# Patient Record
Sex: MALE | Race: WHITE | NOT HISPANIC OR LATINO | Employment: OTHER | URBAN - NONMETROPOLITAN AREA
[De-identification: names, ages, dates, MRNs, and addresses within clinical notes are randomized per-mention and may not be internally consistent; named-entity substitution may affect disease eponyms.]

---

## 2017-01-05 ENCOUNTER — OFFICE VISIT (OUTPATIENT)
Dept: MEDICAL GROUP | Facility: PHYSICIAN GROUP | Age: 82
End: 2017-01-05
Payer: MEDICARE

## 2017-01-05 VITALS
OXYGEN SATURATION: 94 % | HEART RATE: 60 BPM | WEIGHT: 229 LBS | DIASTOLIC BLOOD PRESSURE: 68 MMHG | TEMPERATURE: 98.2 F | RESPIRATION RATE: 16 BRPM | BODY MASS INDEX: 32.78 KG/M2 | HEIGHT: 70 IN | SYSTOLIC BLOOD PRESSURE: 112 MMHG

## 2017-01-05 DIAGNOSIS — E11.8 DIABETIC COMPLICATION (HCC): ICD-10-CM

## 2017-01-05 LAB — GLUCOSE BLD-MCNC: 198 MG/DL (ref 70–100)

## 2017-01-05 PROCEDURE — 82962 GLUCOSE BLOOD TEST: CPT | Performed by: INTERNAL MEDICINE

## 2017-01-05 PROCEDURE — 99214 OFFICE O/P EST MOD 30 MIN: CPT | Performed by: INTERNAL MEDICINE

## 2017-01-05 NOTE — ASSESSMENT & PLAN NOTE
The patient is an 81yo diabetic male who comes in with concerns about his diabetes. Patient recently established with endocrinology with Dr. Malloy. He previously was seen by Dr. Kerr in [place]. Patient was switched for insurance reasons from Humalog to NovoLog. He recently started a shot last night. Previously he was on 20 units of Humalog twice a day but patient tells me he was actually on Humalog 75/25. From the notes in the system, it does not appear that endocrinology was aware that the patient was on the mixed insulin. He specifically states that the insulin was cloudy that he originally was on he was switched on that is clear. Patient took 20 units of NovoLog last night 15 years before his meal as usual but felt very shaky just before eating. His sugar was 82. This morning when he woke up his sugar was in the 80s again. He took 20 units of NovoLog prior to breakfast. He did not take anything before lunch and his glucose today in the room after lunch is 198. He has 6 vials of the NovoLog.    I discussed with the patient today options. He would like to use up the NovoLog as he has some many vials. I'm changing him to 10 units prior to each meal and told him to take the shot 5 minutes prior to eating. I clearly told him if he does not have food in front of him he should not take insulin. Going to contact endocrinology and let them know that he technically was not on short acting Humalog but was instead on the mixed. They may want to add a long-acting insulin or switch him to NovoLog 70/30 prior to his next refill.

## 2017-01-05 NOTE — PROGRESS NOTES
Chief Complaint   Patient presents with   • Diabetes     discuss meds-Novolog       HISTORY OF PRESENT ILLNESS: Patient is a 82 y.o. male established patient who presents today to be seen for an acute issue.    Diabetic complication (HCC)  The patient is an 83yo diabetic male who comes in with concerns about his diabetes. Patient recently established with endocrinology with Dr. Malloy. He previously was seen by Dr. Kerr in [place]. Patient was switched for insurance reasons from Humalog to NovoLog. He recently started a shot last night. Previously he was on 20 units of Humalog twice a day but patient tells me he was actually on Humalog 75/25. From the notes in the system, it does not appear that endocrinology was aware that the patient was on the mixed insulin. He specifically states that the insulin was cloudy that he originally was on he was switched on that is clear. Patient took 20 units of NovoLog last night 15 years before his meal as usual but felt very shaky just before eating. His sugar was 82. This morning when he woke up his sugar was in the 80s again. He took 20 units of NovoLog prior to breakfast. He did not take anything before lunch and his glucose today in the room after lunch is 198. He has 6 vials of the NovoLog.    I discussed with the patient today options. He would like to use up the NovoLog as he has some many vials. I'm changing him to 10 units prior to each meal and told him to take the shot 5 minutes prior to eating. I clearly told him if he does not have food in front of him he should not take insulin. Going to contact endocrinology and let them know that he technically was not on short acting Humalog but was instead on the mixed. They may want to add a long-acting insulin or switch him to NovoLog 70/30 prior to his next refill.      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    Current Outpatient Prescriptions Ordered in Psychiatric   Medication Sig Dispense Refill   • insulin regular (HUMULIN R) 100 Unit/mL Solution Inject 20 Units as instructed 3 times a day before meals. Inject 30 minutes before meals 20 mL 6   • Insulin Syringe-Needle U-100 (INSULIN SYRINGE .5CC/31GX5/16\") 31G X 5/16\" 0.5 ML Misc For insulin injections 3 times a day 300 Each 2   • pioglitazone (ACTOS) 15 MG Tab      • valsartan-hydrochlorothiazide (DIOVAN HCT) 320-12.5 MG per tablet Take 1 Tab by mouth every day.     • furosemide (LASIX) 40 MG Tab Take 40 mg by mouth every day.     • hydrALAZINE (APRESOLINE) 25 MG TABS Take 25 mg by mouth 3 times a day.     • warfarin (COUMADIN) 6 MG TABS Take 1 Tab by mouth every day at 6 PM. 30 Tab 1   • potassium chloride CR (K-TABS) 10 MEQ tablet Take 20 mEq by mouth every day.     • pravastatin (PRAVACHOL) 20 MG TABS Take 80 mg by mouth every evening.     • omeprazole (PRILOSEC) 20 MG CPDR Take 40 mg by mouth every day.     • finasteride (PROSCAR) 5 MG TABS Take 5 mg by mouth every day.     • valsartan-hydrochlorothiazide (DIOVAN-HCT) 320-12.5 MG per tablet Take 0.5 Tabs by mouth every day.     • amlodipine (NORVASC) 5 MG TABS Take 5 mg by mouth every day.     • vitamin D (CHOLECALCIFEROL) 1000 UNIT TABS Take 1,000 Units by mouth every day.       No current Psychiatric-ordered facility-administered medications on file.       Past Medical History   Diagnosis Date   • Hypertension    • Arrhythmia      ATRIAL FIB   • Dental disorder    • Urinary bladder disorder      BPH " "  • Glaucoma      BLIND RIGHT EYE   • Heart murmur    • Peripheral vascular disease (HCC)    • Wound of left leg      COVERED W/ DRESSING OA TO PACU       Social History   Substance Use Topics   • Smoking status: Never Smoker    • Smokeless tobacco: Former User     Quit date: 05/04/2010   • Alcohol Use: No       No family status information on file.   No family history on file.    ROS:  Review of Systems   Constitutional: Negative for fever and malaise/fatigue.   HENT: Negative for congestion  Respiratory: Negative for cough  Cardiovascular: Negative for chest pain  Gastrointestinal: Negative for nausea, vomiting and abdominal pain.  All other systems reviewed and are negative except as in HPI.      Exam:  Blood pressure 112/68, pulse 60, temperature 36.8 °C (98.2 °F), resp. rate 16, height 1.778 m (5' 10\"), weight 103.874 kg (229 lb), SpO2 94 %.  General:  Well nourished, well developed elderly male in NAD  Head is grossly normal.  Neck: Supple without JVD   Pulmonary: Clear to ausculation and percussion.  Normal effort. No rales, ronchi, or wheezing.  Cardiovascular: Regular rate and rhythm without murmur. Carotid and radial pulses are intact and equal bilaterally.  Extremities: no clubbing, cyanosis, or edema.    Glucose :  198    Assessment/Plan:  1. Diabetic complication (HCC)  POCT Glucose    uncontrolled, will discuss with Endocrinology.     Please note that this dictation was created using voice recognition software. I have made every reasonable attempt to correct obvious errors, but I expect that there are errors of grammar and possibly content that I did not discover before finalizing the note.         "

## 2017-01-05 NOTE — PATIENT INSTRUCTIONS
1. With your new insulin Novolog, take 10 units prior to each large meal about 5 minutes before you eat.    2. If you do not eat, DO NOT TAKE NOVOLOG.    3. Dr. Abraham will message Endocrinology to let them know about this.

## 2017-01-05 NOTE — MR AVS SNAPSHOT
"        John Patejv   2017 1:20 PM   Office Visit   MRN: [id number]    Department:  [facility]   Dept Phone:  [phone]    Description:  Male : 1934   Provider:  Sangeeta Abraham M.D.           Reason for Visit     Diabetes discuss meds-Novolog      Allergies as of 2017     No Known Allergies      You were diagnosed with     Diabetic complication (HCC)   [832607]         Vital Signs     Blood Pressure Pulse Temperature Respirations Height Weight    112/68 mmHg 60 36.8 °C (98.2 °F) 16 1.778 m (5' 10\") 103.874 kg (229 lb)    Body Mass Index Oxygen Saturation Smoking Status             32.86 kg/m2 94% Never Smoker          Basic Information     Date Of Birth Sex Race Ethnicity Preferred Language    1934 Male White Non- English      Your appointments     2017  8:45 AM   Anti-Coag Routine with [place] ANTI-COAG   [facility]    [address] [place] [id number] [phone]            Feb 10, 2017  9:00 AM   Established Patient with AIYANA Easley   [facility]    [address] [place] [id number] [phone]           You will be receiving a confirmation call a few days before your appointment from our automated call confirmation system.            Mar 24, 2017 10:00 AM   Telemedicine Clinic Established Pt with Sky Malloy M.D., TELEMED ENDOCRINOLOGY MD, TELEMEDICINE [facility])    [address], [address]  [facility] [address] [phone]              Problem List              ICD-10-CM Priority Class Noted - Resolved    Brachial artery occlusion, right (HCC) I74.2   5/3/2013 - Present    Hypertension I10   5/3/2013 - Present    Type 2 diabetes mellitus with complication (HCC) E11.8   5/3/2013 - Present    PVD (peripheral vascular disease) ([facility]) I73.9   5/3/2013 - Present    Atrial fibrillation (HCC) I48.91   " 5/3/2013 - Present    Esophageal reflux K21.9   5/3/2013 - Present    Dyslipidemia E78.5   5/3/2013 - Present    Atrial fibrillation (HCC) I48.91   9/25/2015 - Present    Amputation of foot, left, traumatic, complicated (HCC) S98.912A, T87.9   11/9/2016 - Present      Health Maintenance        Date Due Completion Dates    DIABETES MONOFILAMTENT / LE EXAM 1935 ---    RETINAL SCREENING 7/27/1952 ---    IMM DTaP/Tdap/Td Vaccine (1 - Tdap) 7/27/1953 ---    IMM PNEUMOCOCCAL 65+ (ADULT) LOW/MEDIUM RISK SERIES (1 of 2 - PCV13) 7/27/1999 ---    SERUM CREATININE 5/4/2014 5/4/2013, 4/5/2006    A1C SCREENING 5/15/2017 11/15/2016, 10/12/2006, 7/6/2006, 4/5/2006, 1/3/2006    FASTING LIPID PROFILE 11/15/2017 11/15/2016, 10/12/2006, 7/6/2006, 4/5/2006    URINE ACR / MICROALBUMIN 11/15/2017 11/15/2016, 1/3/2006            Results     POCT Glucose      Component Value Standard Range & Units    Glucose - Accu-Ck 198 70 - 100 mg/dL                        Current Immunizations     Influenza TIV (IM) 11/1/2012    Influenza Vaccine Adult HD 8/19/2016    SHINGLES VACCINE 11/3/2016      Below and/or attached are the medications your provider expects you to take. Review all of your home medications and newly ordered medications with your provider and/or pharmacist. Follow medication instructions as directed by your provider and/or pharmacist. Please keep your medication list with you and share with your provider. Update the information when medications are discontinued, doses are changed, or new medications (including over-the-counter products) are added; and carry medication information at all times in the event of emergency situations     Allergies:  No Known Allergies          Medications  Valid as of: January 05, 2017 -  1:47 PM    Generic Name Brand Name Tablet Size Instructions for use    AmLODIPine Besylate (Tab) NORVASC 5 MG Take 5 mg by mouth every day.        Cholecalciferol (Tab) cholecalciferol 1000 UNIT Take 1,000 Units by  "mouth every day.        Finasteride (Tab) PROSCAR 5 MG Take 5 mg by mouth every day.        Furosemide (Tab) LASIX 40 MG Take 40 mg by mouth every day.        HydrALAZINE HCl (Tab) APRESOLINE 25 MG Take 25 mg by mouth 3 times a day.        Insulin Regular Human (Solution) HUMULIN R 100 Unit/mL Inject 20 Units as instructed 3 times a day before meals. Inject 30 minutes before meals        Insulin Syringe-Needle U-100 (Misc) INSULIN SYRINGE .5CC/31GX5/16\" 31G X 5/16\" 0.5 ML For insulin injections 3 times a day        Omeprazole (CAPSULE DELAYED RELEASE) PRILOSEC 20 MG Take 40 mg by mouth every day.        Pioglitazone HCl (Tab) ACTOS 15 MG         Potassium Chloride (Tab CR) KLOR-CON 10 MEQ Take 20 mEq by mouth every day.        Pravastatin Sodium (Tab) PRAVACHOL 20 MG Take 80 mg by mouth every evening.        Valsartan-Hydrochlorothiazide (Tab) DIOVAN--12.5 MG Take 0.5 Tabs by mouth every day.        Valsartan-Hydrochlorothiazide (Tab) DIOVAN--12.5 MG Take 1 Tab by mouth every day.        Warfarin Sodium (Tab) COUMADIN 6 MG Take 1 Tab by mouth every day at 6 PM.        .                 Medicines prescribed today were sent to:     [facility] [address] [place] [address]    Phone: [phone] Fax: [phone]    Open 24 Hours?: No      Medication refill instructions:       If your prescription bottle indicates you have medication refills left, it is not necessary to call your provider’s office. Please contact your pharmacy and they will refill your medication.    If your prescription bottle indicates you do not have any refills left, you may request refills at any time through one of the following ways: The online [employer] system (except Urgent Care), by calling your provider’s office, or by asking your pharmacy to contact your provider’s office with a refill request. Medication refills are processed only during regular business hours " and may not be available until the next business day. Your provider may request additional information or to have a follow-up visit with you prior to refilling your medication.   *Please Note: Medication refills are assigned a new Rx number when refilled electronically. Your pharmacy may indicate that no refills were authorized even though a new prescription for the same medication is available at the pharmacy. Please request the medicine by name with the pharmacy before contacting your provider for a refill.        Instructions    1. With your new insulin Novolog, take 10 units prior to each large meal about 5 minutes before you eat.    2. If you do not eat, DO NOT TAKE NOVOLOG.    3. Dr. Abraham will message Endocrinology to let them know about this.          Impermium Access Code: [email or web address]  Expires: 1/11/2017 12:41 PM    Impermium  A secure, online tool to manage your health information     [employer] Impermium® is a secure, online tool that connects you to your personalized health information from the privacy of your home -- day or night - making it very easy for you to manage your healthcare. Once the activation process is completed, you can even access your medical information using the Impermium mallorie, which is available for free in the Apple Mallorie store or Google Play store.     Impermium provides the following levels of access (as shown below):   My Chart Features   [facility] Doctor [facility]  Urgent  Care Non-[facility]  Primary Care  Doctor   Email your healthcare team securely and privately 24/7 X X X    Manage appointments: schedule your next appointment; view details of past/upcoming appointments X      Request prescription refills. X      View recent personal medical records, including lab and immunizations X X X X   View health record, including health history, allergies, medications X X X X   Read reports about your outpatient visits, procedures, consult and ER notes X X X X    See your discharge summary, which is a recap of your hospital and/or ER visit that includes your diagnosis, lab results, and care plan. X X       How to register for "[employer]":  1. Go to  [email or web address].  2. Click on the Sign Up Now box, which takes you to the New Member Sign Up page. You will need to provide the following information:  a. Enter your "[employer]" Access Code exactly as it appears at the top of this page. (You will not need to use this code after you’ve completed the sign-up process. If you do not sign up before the expiration date, you must request a new code.)   b. Enter your date of birth.   c. Enter your home email address.   d. Click Submit, and follow the next screen’s instructions.  3. Create a "[employer]" ID. This will be your "[employer]" login ID and cannot be changed, so think of one that is secure and easy to remember.  4. Create a "[employer]" password. You can change your password at any time.  5. Enter your Password Reset Question and Answer. This can be used at a later time if you forget your password.   6. Enter your e-mail address. This allows you to receive e-mail notifications when new information is available in "[employer]".  7. Click Sign Up. You can now view your health information.    For assistance activating your "[employer]" account, call [phone]

## 2017-01-09 ENCOUNTER — TELEPHONE (OUTPATIENT)
Dept: ENDOCRINOLOGY | Facility: MEDICAL CENTER | Age: 82
End: 2017-01-09

## 2017-01-09 NOTE — TELEPHONE ENCOUNTER
Called Pt and notified.  Pt mentioned that he don't know how to retrieve the reading out of his meter.    Unless if he is going to [facility] and also nobody can help him at home to retrieve his BS reading.    Thank you  Leah

## 2017-01-09 NOTE — TELEPHONE ENCOUNTER
Patient came in to [facility] to retrieve bs reading out of his meter.   Here is the last week of readings.:  1/9/ at 8am  1/8/ at 6pm, 223 at 8am  1/7/17 339 at 8am  1/6/17 268 at 6pm, 240 at 8am  1/5/17 175 at 6 pm, 223 at 8am  1/4/17 213 at 8pm, 125 at 7am  1/3/17 267 at 5pm, 175 at 5 pm  1/2/17 224 at 5pm, 140 at 8am

## 2017-01-09 NOTE — TELEPHONE ENCOUNTER
----- Message from Sky Malloy M.D. sent at 1/6/2017  3:59 PM PST -----  Please contact this patient to get some information on his blood sugar log, we need to know the blood sugar readings to make some changes to his insulin regimen.

## 2017-01-10 ENCOUNTER — TELEPHONE (OUTPATIENT)
Dept: ENDOCRINOLOGY | Facility: MEDICAL CENTER | Age: 82
End: 2017-01-10

## 2017-01-10 DIAGNOSIS — E11.65 TYPE 2 DIABETES MELLITUS WITH HYPERGLYCEMIA, WITH LONG-TERM CURRENT USE OF INSULIN (HCC): ICD-10-CM

## 2017-01-10 DIAGNOSIS — Z79.4 TYPE 2 DIABETES MELLITUS WITH HYPERGLYCEMIA, WITH LONG-TERM CURRENT USE OF INSULIN (HCC): ICD-10-CM

## 2017-01-20 ENCOUNTER — ANTICOAGULATION VISIT (OUTPATIENT)
Dept: MEDICAL GROUP | Facility: PHYSICIAN GROUP | Age: 82
End: 2017-01-20
Payer: MEDICARE

## 2017-01-20 ENCOUNTER — ANTICOAGULATION MONITORING (OUTPATIENT)
Dept: MEDICAL GROUP | Facility: PHYSICIAN GROUP | Age: 82
End: 2017-01-20

## 2017-01-20 DIAGNOSIS — I48.21 PERMANENT ATRIAL FIBRILLATION (HCC): ICD-10-CM

## 2017-01-20 DIAGNOSIS — I48.91 ATRIAL FIBRILLATION, UNSPECIFIED TYPE (HCC): ICD-10-CM

## 2017-01-20 LAB — INR PPP: 2.4 (ref 2–3.5)

## 2017-01-20 PROCEDURE — 85610 PROTHROMBIN TIME: CPT | Performed by: PHYSICIAN ASSISTANT

## 2017-01-20 RX ORDER — WARFARIN SODIUM 6 MG/1
6 TABLET ORAL DAILY
Qty: 90 TAB | Refills: 1 | Status: SHIPPED | OUTPATIENT
Start: 2017-01-20 | End: 2017-11-20 | Stop reason: SDUPTHER

## 2017-01-20 NOTE — PROGRESS NOTES
Anticoagulation Summary as of 1/20/2017     INR goal 2.0-3.0   Selected INR 2.4 (1/20/2017)   Maintenance plan 6 mg (6 mg x 1) on Mon, Wed, Fri; 3 mg (6 mg x 0.5) all other days   Weekly total 30 mg   Plan last modified Efe Tay, PHARMD (12/12/2016)   Next INR check 3/3/2017   Target end date Indefinite    Indications   Atrial fibrillation (CMS-HCC) [I48.91]  Atrial fibrillation (CMS-HCC) [I48.91]         Anticoagulation Episode Summary     INR check location [facility]    Preferred lab     Send INR reminders to     Comments       Anticoagulation Care Providers     Provider Role Specialty Phone number    Dontae Ng D.O. [facility] [phone]    [facility]   [phone]    JOHN Easley.  Family Medicine [phone]        Anticoagulation Patient Findings      John Hernandez seen in clinic today  INR  therapeutic.    Denies signs/symptoms of bleeding and/or thrombosis.    Denies changes to diet or medications.   Follow up appointment in 6 week(s).    Continue weekly warfarin dose as noted    Keith Collado, PHARMD

## 2017-01-20 NOTE — MR AVS SNAPSHOT
John SUMA Mary   2017 8:45 AM   Anticoagulation Visit   MRN: [id number]    Department:  [facility]   Dept Phone:  [phone]    Description:  Male : 1934   Provider:  KM ANTI-COAG           Allergies as of 2017     No Known Allergies      You were diagnosed with     Atrial fibrillation, unspecified type (CMS-HCC)   [3307169]       Permanent atrial fibrillation (CMS-HCC)   [480401]         Vital Signs     Smoking Status                   Never Smoker            Basic Information     Date Of Birth Sex Race Ethnicity Preferred Language    1934 Male White Non- English      Your appointments     2017  8:45 AM   Anti-Coag Routine with [place] ANTI-COAG   [facility] ([place])    [address] [place] [id number] [phone]            Feb 10, 2017  9:00 AM   Established Patient with AIYANA Easley   [facility] ([place])    [address] [place] [id number] [phone]           You will be receiving a confirmation call a few days before your appointment from our automated call confirmation system.            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   5/3/2013 - Present    Hypertension I10   5/3/2013 - Present    Type 2 diabetes mellitus with complication (CMS-[facility]) E11.8   5/3/2013 - Present    PVD (peripheral vascular disease) (CMS-HCC) I73.9   5/3/2013 - Present     Atrial fibrillation (CMS-HCC) I48.91   5/3/2013 - Present    Esophageal reflux K21.9   5/3/2013 - Present    Dyslipidemia E78.5   5/3/2013 - Present    Atrial fibrillation (CMS-HCC) I48.91   9/25/2015 - Present    Amputation of foot, left, traumatic, 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, T87.9   11/9/2016 - Present    Diabetic complication (CMS-HCC) E11.8   1/5/2017 - Present      Health Maintenance        Date Due Completion Dates    DIABETES MONOFILAMENT / LE EXAM 1935 ---    RETINAL SCREENING 7/27/1952 ---    IMM DTaP/Tdap/Td Vaccine (1 - Tdap) 7/27/1953 ---    IMM PNEUMOCOCCAL 65+ (ADULT) LOW/MEDIUM RISK SERIES (1 of 2 - PCV13) 7/27/1999 ---    SERUM CREATININE 5/4/2014 5/4/2013, 4/5/2006    A1C SCREENING 5/15/2017 11/15/2016, 10/12/2006, 7/6/2006, 4/5/2006, 1/3/2006    FASTING LIPID PROFILE 11/15/2017 11/15/2016, 10/12/2006, 7/6/2006, 4/5/2006    URINE ACR / MICROALBUMIN 11/15/2017 11/15/2016, 1/3/2006            Results     POCT Protime      Component    INR    2.4    Comment:     ic valid [id number] 160 exp 10/2017                        Current Immunizations     Influenza TIV (IM) 11/1/2012    Influenza Vaccine Adult HD 8/19/2016    SHINGLES VACCINE 11/3/2016      Below and/or attached are the medications your provider expects you to take. Review all of your home medications and newly ordered medications with your provider and/or pharmacist. Follow medication instructions as directed by your provider and/or pharmacist. Please keep your medication list with you and share with your provider. Update the information when medications are discontinued, doses are changed, or new medications (including over-the-counter products) are added; and carry medication information at all times in the event of emergency situations     Allergies:  No Known Allergies          Medications  Valid as of: January 20, 2017 -  8:29 AM    Generic Name Brand Name Tablet Size Instructions for use    AmLODIPine Besylate (Tab) NORVASC 5  "MG Take 5 mg by mouth every day.        Cholecalciferol (Tab) cholecalciferol 1000 UNIT Take 1,000 Units by mouth every day.        Finasteride (Tab) PROSCAR 5 MG Take 5 mg by mouth every day.        Furosemide (Tab) LASIX 40 MG Take 40 mg by mouth every day.        HydrALAZINE HCl (Tab) APRESOLINE 25 MG Take 25 mg by mouth 3 times a day.        Insulin NPH Isophane & Regular (Suspension) HUMULIN,NOVOLIN (70-30) 100 UNIT/ML Inject 18 Units as instructed 3 times a day before meals.        Insulin Syringe-Needle U-100 (Misc) INSULIN SYRINGE .5CC/31GX5/16\" 31G X 5/16\" 0.5 ML For insulin injections 3 times a day        Omeprazole (CAPSULE DELAYED RELEASE) PRILOSEC 20 MG Take 40 mg by mouth every day.        Pioglitazone HCl (Tab) ACTOS 15 MG         Potassium Chloride (Tab CR) KLOR-CON 10 MEQ Take 20 mEq by mouth every day.        Pravastatin Sodium (Tab) PRAVACHOL 20 MG Take 80 mg by mouth every evening.        Valsartan-Hydrochlorothiazide (Tab) DIOVAN--12.5 MG Take 0.5 Tabs by mouth every day.        Valsartan-Hydrochlorothiazide (Tab) DIOVAN--12.5 MG Take 1 Tab by mouth every day.        Warfarin Sodium (Tab) COUMADIN 6 MG Take 1 Tab by mouth every day at 6 PM.        .                 Medicines prescribed today were sent to:     [facility] [address] [place] [address]    Phone: [phone] Fax: [phone]    Open 24 Hours?: No      Medication refill instructions:       If your prescription bottle indicates you have medication refills left, it is not necessary to call your provider’s office. Please contact your pharmacy and they will refill your medication.    If your prescription bottle indicates you do not have any refills left, you may request refills at any time through one of the following ways: The online ContraFect system (except Urgent Care), by calling your provider’s office, or by asking your pharmacy to contact your " provider’s office with a refill request. Medication refills are processed only during regular business hours and may not be available until the next business day. Your provider may request additional information or to have a follow-up visit with you prior to refilling your medication.   *Please Note: Medication refills are assigned a new Rx number when refilled electronically. Your pharmacy may indicate that no refills were authorized even though a new prescription for the same medication is available at the pharmacy. Please request the medicine by name with the pharmacy before contacting your provider for a refill.        Warfarin Dosing Calendar   January 2017 Details    Sun Mon Tue Wed Thu Fri Sat     1               2               3               4               5               6               7                 8               9               10               11               12               13               14                 15               16               17               18               19               20   2.4   6 mg   See details      21      3 mg           22      3 mg         23      6 mg         24      3 mg         25      6 mg         26      3 mg         27      6 mg         28      3 mg           29      3 mg         30      6 mg         31      3 mg              Date Details   01/20 This INR check   INR: 2.4   ic valid [id number] 160 exp 10/2017               How to take your warfarin dose     To take:  3 mg Take 0.5 of a 6 mg tablet.    To take:  6 mg Take 1 of the 6 mg tablets.           Warfarin Dosing Calendar   February 2017 Details    Sun Mon Tue Wed Thu Fri Sat        1      6 mg         2      3 mg         3      6 mg         4      3 mg           5      3 mg         6      6 mg         7      3 mg         8      6 mg         9      3 mg         10      6 mg         11      3 mg           12      3 mg         13      6 mg         14      3 mg         15      6 mg         16      3 mg          17      6 mg         18      3 mg           19      3 mg         20      6 mg         21      3 mg         22      6 mg         23      3 mg         24      6 mg         25      3 mg           26      3 mg         27      6 mg         28      3 mg              Date Details   No additional details            How to take your warfarin dose     To take:  3 mg Take 0.5 of a 6 mg tablet.    To take:  6 mg Take 1 of the 6 mg tablets.           Warfarin Dosing Calendar   March 2017 Details    Sun Mon Tue Wed Thu Fri Sat        1      6 mg         2      3 mg         3      6 mg         4                 5               6               7               8               9               10               11                 12               13               14               15               16               17               18                 19               20               21               22               23               24               25                 26               27               28               29               30               31                 Date Details   No additional details    Date of next INR:  3/3/2017         How to take your warfarin dose     To take:  3 mg Take 0.5 of a 6 mg tablet.    To take:  6 mg Take 1 of the 6 mg tablets.              Brand Thunder Access Code: [email or web address]  Expires: 2/19/2017  8:29 AM    Brand Thunder  A secure, online tool to manage your health information     [employer]’s Brand Thunder® is a secure, online tool that connects you to your personalized health information from the privacy of your home -- day or night - making it very easy for you to manage your healthcare. Once the activation process is completed, you can even access your medical information using the Brand Thunder mallorie, which is available for free in the Apple Mallorie store or Google Play store.     Brand Thunder provides the following levels of access (as shown below):   My Chart Features   [facility] Primary Care Doctor  [facility]  Specialists [facility]  Urgent  Care Non-[facility]  Primary Care  Doctor   Email your healthcare team securely and privately 24/7 X X X    Manage appointments: schedule your next appointment; view details of past/upcoming appointments X      Request prescription refills. X      View recent personal medical records, including lab and immunizations X X X X   View health record, including health history, allergies, medications X X X X   Read reports about your outpatient visits, procedures, consult and ER notes X X X X   See your discharge summary, which is a recap of your hospital and/or ER visit that includes your diagnosis, lab results, and care plan. X X       How to register for FaithStreet:  1. Go to  [email or web address].  2. Click on the Sign Up Now box, which takes you to the New Member Sign Up page. You will need to provide the following information:  a. Enter your FaithStreet Access Code exactly as it appears at the top of this page. (You will not need to use this code after you’ve completed the sign-up process. If you do not sign up before the expiration date, you must request a new code.)   b. Enter your date of birth.   c. Enter your home email address.   d. Click Submit, and follow the next screen’s instructions.  3. Create a FaithStreet ID. This will be your FaithStreet login ID and cannot be changed, so think of one that is secure and easy to remember.  4. Create a FaithStreet password. You can change your password at any time.  5. Enter your Password Reset Question and Answer. This can be used at a later time if you forget your password.   6. Enter your e-mail address. This allows you to receive e-mail notifications when new information is available in FaithStreet.  7. Click Sign Up. You can now view your health information.    For assistance activating your FaithStreet account, call [phone]

## 2017-01-23 ENCOUNTER — TELEPHONE (OUTPATIENT)
Dept: ENDOCRINOLOGY | Facility: MEDICAL CENTER | Age: 82
End: 2017-01-23

## 2017-01-23 NOTE — TELEPHONE ENCOUNTER
He needs to be on Novolin 70/30.    When I talked to him during telemedicine consult he reported that he was on NovoLog alone and no long-acting insulin (however his fasting blood sugars were very well controlled). I sent him a prescription of Humulin R as it would be cheaper compared to NovoLog, however his fasting blood sugars became quite elevated on Humulin R (which confirms that he was actually on NovoLog mix and not NovoLog alone). So we need to switch to mix insulin Novolin 70/30. If he would rather stay on NovoLog with meals we can add one long-acting insulin shot at bedtime.     It would be best if he could make an appointment to follow-up with me in the clinic for next appointment rather than telemedicine, has some difficulty with hearing which makes it difficult to communicate via telemedicine.

## 2017-01-23 NOTE — TELEPHONE ENCOUNTER
Pt called and he mentioned that he got the Novolog vials from [facility] for 6 vials.    He didn't know what medication he need to be on if it is Novolin 70/30 or just a meal insulin Novolog.    Right now he had the Novolog on hand.    Thank you  Leah

## 2017-01-24 NOTE — TELEPHONE ENCOUNTER
Called Pt and he mentioned that he don't want to come in our clinic because he live in [place] and it is hard for him.    I did sent him a letter with the instruction of his Novolin 70/30.    Thank you  Leah

## 2017-02-02 RX ORDER — PIOGLITAZONEHYDROCHLORIDE 15 MG/1
15 TABLET ORAL DAILY
Qty: 30 TAB | Refills: 4 | Status: SHIPPED | OUTPATIENT
Start: 2017-02-02 | End: 2017-07-10 | Stop reason: SDUPTHER

## 2017-02-02 RX ORDER — OMEPRAZOLE 20 MG/1
40 CAPSULE, DELAYED RELEASE ORAL DAILY
Qty: 60 CAP | Refills: 3 | Status: SHIPPED | OUTPATIENT
Start: 2017-02-02 | End: 2017-12-11 | Stop reason: SDUPTHER

## 2017-02-10 ENCOUNTER — OFFICE VISIT (OUTPATIENT)
Dept: MEDICAL GROUP | Facility: PHYSICIAN GROUP | Age: 82
End: 2017-02-10
Payer: MEDICARE

## 2017-02-10 VITALS
OXYGEN SATURATION: 96 % | DIASTOLIC BLOOD PRESSURE: 58 MMHG | WEIGHT: 233 LBS | SYSTOLIC BLOOD PRESSURE: 140 MMHG | RESPIRATION RATE: 16 BRPM | HEART RATE: 48 BPM | TEMPERATURE: 98.7 F | BODY MASS INDEX: 33.36 KG/M2 | HEIGHT: 70 IN

## 2017-02-10 DIAGNOSIS — Z79.4 TYPE 2 DIABETES MELLITUS WITH COMPLICATION, WITH LONG-TERM CURRENT USE OF INSULIN (HCC): ICD-10-CM

## 2017-02-10 DIAGNOSIS — I70.208 BRACHIAL ARTERY OCCLUSION, RIGHT (HCC): ICD-10-CM

## 2017-02-10 DIAGNOSIS — E11.8 TYPE 2 DIABETES MELLITUS WITH COMPLICATION, WITH LONG-TERM CURRENT USE OF INSULIN (HCC): ICD-10-CM

## 2017-02-10 DIAGNOSIS — I10 ESSENTIAL HYPERTENSION: ICD-10-CM

## 2017-02-10 DIAGNOSIS — H91.93 BILATERAL HEARING LOSS: ICD-10-CM

## 2017-02-10 DIAGNOSIS — I48.21 PERMANENT ATRIAL FIBRILLATION (HCC): ICD-10-CM

## 2017-02-10 PROCEDURE — 1036F TOBACCO NON-USER: CPT | Performed by: NURSE PRACTITIONER

## 2017-02-10 PROCEDURE — 4040F PNEUMOC VAC/ADMIN/RCVD: CPT | Mod: 8P | Performed by: NURSE PRACTITIONER

## 2017-02-10 PROCEDURE — G8419 CALC BMI OUT NRM PARAM NOF/U: HCPCS | Performed by: NURSE PRACTITIONER

## 2017-02-10 PROCEDURE — G8598 ASA/ANTIPLAT THER USED: HCPCS | Performed by: NURSE PRACTITIONER

## 2017-02-10 PROCEDURE — G8482 FLU IMMUNIZE ORDER/ADMIN: HCPCS | Performed by: NURSE PRACTITIONER

## 2017-02-10 PROCEDURE — 1101F PT FALLS ASSESS-DOCD LE1/YR: CPT | Performed by: NURSE PRACTITIONER

## 2017-02-10 PROCEDURE — G8432 DEP SCR NOT DOC, RNG: HCPCS | Performed by: NURSE PRACTITIONER

## 2017-02-10 PROCEDURE — 99214 OFFICE O/P EST MOD 30 MIN: CPT | Performed by: NURSE PRACTITIONER

## 2017-02-10 NOTE — ASSESSMENT & PLAN NOTE
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 He wears a prosthetic, in fact he sees a prosthetic specialist today in his home this afternoon. He tolerates the prosthetic well with no difficulties. He ambulates without any difficulties as well. He tells me that his amputation was not due to diabetes but due to a severe infection after what he states was very poor wound care after a surgical intervention related to a fracture.

## 2017-02-10 NOTE — MR AVS SNAPSHOT
"        John Patejv   2/10/2017 9:00 AM   Office Visit   MRN: [id number]    Department:  [facility]   Dept Phone:  [phone]    Description:  Male : 1934   Provider:  AIYANA Easley           Reason for Visit     Follow-Up           Allergies as of 2/10/2017     No Known Allergies      You were diagnosed with     Type 2 diabetes mellitus with complication, with long-term current use of insulin (CMS-HCC)   [4914534]         Vital Signs     Blood Pressure Pulse Temperature Respirations Height Weight    140/58 mmHg 48 37.1 °C (98.7 °F) 16 1.778 m (5' 10\") 105.688 kg (233 lb)    Body Mass Index Oxygen Saturation Smoking Status             33.43 kg/m2 96% Never Smoker          Basic Information     Date Of Birth Sex Race Ethnicity Preferred Language    1934 Male White Non- English      Your appointments     Mar 03, 2017  8:45 AM   Anti-Coag Routine with [place] ANTI-COAG   [facility] ([place])    [address] [place] [address] [phone]            Mar 24, 2017 10:00 AM   Telemedicine Clinic Established Pt with Sky Malloy M.D., TELEMED ENDOCRINOLOGY MD, TELEMEDICINE [facility] Endocrinology [facility]    [address], [address]  [facility] [id number] [phone]            Aug 04, 2017  8:20 AM   Established Patient with AIYANA Easley   [facility] ([place]    [address] [place] [address] [phone]           You will be receiving a confirmation call a few days before your appointment from our automated call confirmation system.              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   5/3/2013 - Present    Hypertension I10   5/3/2013 - Present    Type 2 diabetes mellitus with complication (CMS-HCC) E11.8   5/3/2013 - Present    PVD (peripheral vascular disease) " (CMS-HCC) I73.9   5/3/2013 - Present    Atrial fibrillation (CMS-HCC) I48.91   5/3/2013 - Present    Esophageal reflux K21.9   5/3/2013 - Present    Dyslipidemia E78.5   5/3/2013 - Present    Atrial fibrillation (CMS-HCC) I48.91   9/25/2015 - Present    Amputation of foot, left, traumatic, 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, T87.9   11/9/2016 - Present    Diabetic complication (CMS-HCC) E11.8   1/5/2017 - Present      Health Maintenance        Date Due Completion Dates    DIABETES MONOFILAMENT / LE EXAM 1935 ---    RETINAL SCREENING 7/27/1952 ---    IMM DTaP/Tdap/Td Vaccine (1 - Tdap) 7/27/1953 ---    IMM PNEUMOCOCCAL 65+ (ADULT) LOW/MEDIUM RISK SERIES (1 of 2 - PCV13) 7/27/1999 ---    SERUM CREATININE 5/4/2014 5/4/2013, 4/5/2006    A1C SCREENING 5/15/2017 11/15/2016, 10/12/2006, 7/6/2006, 4/5/2006, 1/3/2006    FASTING LIPID PROFILE 11/15/2017 11/15/2016, 10/12/2006, 7/6/2006, 4/5/2006    URINE ACR / MICROALBUMIN 11/15/2017 11/15/2016, 1/3/2006            Current Immunizations     Influenza TIV (IM) 11/1/2012    Influenza Vaccine Adult HD 8/19/2016    SHINGLES VACCINE 11/3/2016      Below and/or attached are the medications your provider expects you to take. Review all of your home medications and newly ordered medications with your provider and/or pharmacist. Follow medication instructions as directed by your provider and/or pharmacist. Please keep your medication list with you and share with your provider. Update the information when medications are discontinued, doses are changed, or new medications (including over-the-counter products) are added; and carry medication information at all times in the event of emergency situations     Allergies:  No Known Allergies          Medications  Valid as of: February 10, 2017 -  9:32 AM    Generic Name Brand Name Tablet Size Instructions for use    AmLODIPine Besylate (Tab) NORVASC 5 MG Take 5 mg by mouth every day.        Cholecalciferol (Tab) cholecalciferol 1000 UNIT  "Take 1,000 Units by mouth every day.        Finasteride (Tab) PROSCAR 5 MG Take 5 mg by mouth every day.        Furosemide (Tab) LASIX 40 MG Take 40 mg by mouth every day.        HydrALAZINE HCl (Tab) APRESOLINE 25 MG Take 25 mg by mouth 3 times a day.        Insulin NPH Isophane & Regular (Suspension) HUMULIN,NOVOLIN (70-30) 100 UNIT/ML Inject 18 Units as instructed 3 times a day before meals.        Insulin Syringe-Needle U-100 (Misc) INSULIN SYRINGE .5CC/31GX5/16\" 31G X 5/16\" 0.5 ML For insulin injections 3 times a day        Omeprazole (CAPSULE DELAYED RELEASE) PRILOSEC 20 MG Take 2 Caps by mouth every day.        Pioglitazone HCl (Tab) ACTOS 15 MG Take 1 Tab by mouth every day.        Potassium Chloride (Tab CR) KLOR-CON 10 MEQ Take 20 mEq by mouth every day.        Pravastatin Sodium (Tab) PRAVACHOL 20 MG Take 80 mg by mouth every evening.        Valsartan-Hydrochlorothiazide (Tab) DIOVAN--12.5 MG Take 0.5 Tabs by mouth every day.        Valsartan-Hydrochlorothiazide (Tab) DIOVAN--12.5 MG Take 1 Tab by mouth every day.        Warfarin Sodium (Tab) COUMADIN 6 MG Take 1 Tab by mouth every day at 6 PM.        .                 Medicines prescribed today were sent to:     [facility] [address] [place] [address]    Phone: [phone] Fax: [phone]    Open 24 Hours?: No      Medication refill instructions:       If your prescription bottle indicates you have medication refills left, it is not necessary to call your provider’s office. Please contact your pharmacy and they will refill your medication.    If your prescription bottle indicates you do not have any refills left, you may request refills at any time through one of the following ways: The online [employer] the [employer] system (except Urgent Care), by calling your provider’s office, or by asking your pharmacy to contact your provider’s office with a refill request. Medication refills are " processed only during regular business hours and may not be available until the next business day. Your provider may request additional information or to have a follow-up visit with you prior to refilling your medication.   *Please Note: Medication refills are assigned a new Rx number when refilled electronically. Your pharmacy may indicate that no refills were authorized even though a new prescription for the same medication is available at the pharmacy. Please request the medicine by name with the pharmacy before contacting your provider for a refill.        Your To Do List     Future Labs/Procedures Complete By Expires    COMP METABOLIC PANEL (For Serum Creatinine Topic, choose 1 only)  As directed 2/10/2018      Instructions    Call endocrinologist's office and see if you can make your appointment with him in person to at least meet him one time--then your follow up appointments can be via telemedicine    Keep upcoming appointments with specialists    Follow up with me in 6 months    Labs today          Optisense Access Code: [email or web address]  Expires: 2/19/2017  8:29 AM    Optisense  A secure, online tool to manage your health information     [employer]’s Optisense® is a secure, online tool that connects you to your personalized health information from the privacy of your home -- day or night - making it very easy for you to manage your healthcare. Once the activation process is completed, you can even access your medical information using the Optisense mallorie, which is available for free in the Apple Mallorie store or Google Play store.     Optisense provides the following levels of access (as shown below):   My Chart Features   [facility] Primary Care Doctor [facility]  Specialists [facility]  Urgent  Care Non-[facility]  Primary Care  Doctor   Email your healthcare team securely and privately 24/7 X X X    Manage appointments: schedule your next appointment; view details of past/upcoming appointments X      Request prescription refills.  X      View recent personal medical records, including lab and immunizations X X X X   View health record, including health history, allergies, medications X X X X   Read reports about your outpatient visits, procedures, consult and ER notes X X X X   See your discharge summary, which is a recap of your hospital and/or ER visit that includes your diagnosis, lab results, and care plan. X X       How to register for [email or web address]:  1. Go to  [email or web address].  2. Click on the Sign Up Now box, which takes you to the New Member Sign Up page. You will need to provide the following information:  a. Enter your [email or web address] Access Code exactly as it appears at the top of this page. (You will not need to use this code after you’ve completed the sign-up process. If you do not sign up before the expiration date, you must request a new code.)   b. Enter your date of birth.   c. Enter your home email address.   d. Click Submit, and follow the next screen’s instructions.  3. Create a [email or web address] ID. This will be your [email or web address] login ID and cannot be changed, so think of one that is secure and easy to remember.  4. Create a [email or web address] password. You can change your password at any time.  5. Enter your Password Reset Question and Answer. This can be used at a later time if you forget your password.   6. Enter your e-mail address. This allows you to receive e-mail notifications when new information is available in [email or web address].  7. Click Sign Up. You can now view your health information.    For assistance activating your [email or web address] account, call [phone]

## 2017-02-10 NOTE — PROGRESS NOTES
Chief Complaint   Patient presents with   • Follow-Up         This is a 82 y.o.male patient that presents today with the following: Follow-up visit    Amputation of foot, left, traumatic, complicated (CMS-HCC)  Patient has history of left foot amputation status post trauma several years ago. He wears a prosthetic, in fact he sees a prosthetic specialist today in his home this afternoon. He tolerates the prosthetic well with no difficulties. He ambulates without any difficulties as well. He tells me that his amputation was not due to diabetes but due to a severe infection after what he states was very poor wound care after a surgical intervention related to a fracture.    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 He states he is also followed by vascular medicine in which he has an upcoming appointment he reports.    Atrial fibrillation  Chronic in nature, stable and well-controlled on Coumadin. He is followed by the Coumadin clinic as well. He is also followed by cardiology, and he states he has an upcoming appointment in April.    Type 2 diabetes mellitus with complication (CMS-[facility])  This is chronic in nature, stable and well-controlled on current regimen. His last hemoglobin A1c in November was 7.4. At his last visit when he established care with me he was referred to a new provider as his past provider is in [place] and he no longer wanted to travel to [place], wanted to see someone via telemedicine. He has since met that provider and a plan of care has been established. There is been many changes made to his insulin regimen, but ultimately he is now on NovoLog 70/30. I did review the notes, and at some point the endocrinologist wanted to meet the patient in office at least one time to go over the plan of care. I again reiterated this to the patient today and he states that he is agreeable to going in at least once in person to meet the endocrinologist and  go over all his medications and his diabetes plan. He states he will call the office sometime today to see if he can change his upcoming telemedicine appointment to an in office appointment in [place]. If he cannot change his upcoming appointment, he will at least try to make the next appointment an in office appointment.  Otherwise, he states he's doing well, tolerating all of his diabetes medications. He just had his eyes examined last month and has a new prescription for glasses. 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 He is due for repeat lab to check his kidney functions, this has been ordered. I did check his right foot, he does have decreased sensation and circulation and significant discoloration about the ankle and calf. He does wear a compression stocking. He sees podiatry he tells me to address his toenails.  I have encouraged him to continue all of his routine medications and keep all of his upcoming appointments with specialists. I will see him back in 6 months with labs done before that visit.    Hypertension  This is chronic in nature, stable  on current regimen. He takes hydralazine 25 mg 3 times a day, valsartan-hydrochlorothiazide 320-12 0.5 mg daily, and amlodipine 5 mg daily. His blood pressure today was 140/58, it was 112/68 in early January. We establish care with me in November, blood pressure was 130/78. He does deny symptoms of hypertension. He reports to me cardiology manages his his medications for this. I did encourage him to discuss this regimen with his cardiologist coming up in April, as there may need to be adjustments made.    Bilateral hearing loss  Patient is significantly hard of hearing. 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 He has been assessed for hearing loss and has been fitted for hearing aids, but he just does not like to wear them. His batteries are always running low and he does not like to replace them. So, he simply just does not wear them. He did see a commercial in which hearing devices caused and only $30, he thinks he would like to try those. I did offer to refer to have his ears reassessed and possibly the refitted for new hearing aids, but he declines and states he will just try the once he saw on TV at this time.      Anticoagulation Visit on 01/20/2017   Component Date Value   • INR 01/20/2017 2.4          clinical course has been stable    Past Medical History   Diagnosis Date   • Hypertension    • Arrhythmia      ATRIAL FIB   • Dental disorder    • Urinary bladder disorder      BPH   • Glaucoma      BLIND RIGHT EYE   • Heart murmur    • Peripheral vascular disease (CMS-HCC)    • Wound of left leg      COVERED W/ DRESSING OA TO PACU       Past Surgical History   Procedure Laterality Date   • Other neurological surg       BKA LEFT LEG   • Brachial embolectomy  5/3/2013     Performed by Ananth Cook M.D. at SURGERY [facility] ORS       No family history on file.    Review of patient's allergies indicates no known allergies.    Current Outpatient Prescriptions Ordered in [facility]   Medication Sig Dispense Refill   • pioglitazone (ACTOS) 15 MG Tab Take 1 Tab by mouth every day. 30 Tab 4   • omeprazole (PRILOSEC) 20 MG delayed-release capsule Take 2 Caps by mouth every day. 60 Cap 3   • warfarin (COUMADIN) 6 MG Tab Take 1 Tab by mouth every day at 6 PM. 90 Tab 1   • insulin 70/30 (NOVOLIN 70/30 RELION) (70-30) 100 UNIT/ML Suspension Inject 18 Units as instructed 3 times a day before meals. 20 mL 6   • Insulin Syringe-Needle U-100 (INSULIN SYRINGE .5CC/31GX5/16\") 31G X 5/16\" 0.5 ML Misc For insulin injections 3 times a day 300 Each 2   • valsartan-hydrochlorothiazide (DIOVAN HCT) 320-12.5 MG per tablet Take 1 Tab " "by mouth every day.     • hydrALAZINE (APRESOLINE) 25 MG TABS Take 25 mg by mouth 3 times a day.     • pravastatin (PRAVACHOL) 20 MG TABS Take 80 mg by mouth every evening.     • finasteride (PROSCAR) 5 MG TABS Take 5 mg by mouth every day.     • vitamin D (CHOLECALCIFEROL) 1000 UNIT TABS Take 1,000 Units by mouth every day.     • furosemide (LASIX) 40 MG Tab Take 40 mg by mouth every day.     • valsartan-hydrochlorothiazide (DIOVAN-HCT) 320-12.5 MG per tablet Take 0.5 Tabs by mouth every day.     • potassium chloride CR (K-TABS) 10 MEQ tablet Take 20 mEq by mouth every day.     • amlodipine (NORVASC) 5 MG TABS Take 5 mg by mouth every day.       No current [facility]-ordered facility-administered medications on file.       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  Neck ROS: No lumps or masses, No swollen glands, No significant pain in neck  Pulmonary ROS: No chronic cough, sputum, or hemoptysis, No dyspnea on exertion, No shortness of breath  Cardiovascular ROS: No chest pain, No edema, No palpitations, Positive for arrhythmia, hypertension   Gastrointestinal ROS: No abdominal pain, No nausea, vomiting, diarrhea, or constipation, no blood in stool  Musculoskeletal/Extremities ROS: Positive for amputation left foot  Skin/Integumentary ROS: Positive for diabetic skin changes to right calf and ankle, per history of present illness  Neurologic ROS: Normal development, No seizures, No weakness  Endocrine ROS: Positive per history of present illness    Physical exam:  /58 mmHg  Pulse 48  Temp(Src) 37.1 °C (98.7 °F)  Resp 16  Ht 1.778 m (5' 10\")  Wt 105.688 kg (233 lb)  BMI 33.43 kg/m2  SpO2 96%  General Appearance: Extremely hard of hearing elderly male, alert, no distress, obese, well-groomed  Skin: Skin color, texture, turgor normal. No rashes or lesions.  Eyes: positive findings: eyelids/periorbital " -anophthalmia right eye  Lungs: negative findings: normal respiratory rate and rhythm, lungs clear to auscultation  Heart: negative. RRR without murmur, gallop, or rubs.  No ectopy.  Abdomen: Abdomen soft, non-tender. BS normal. No masses,  No organomegaly  Extremities: positive findings: Right lower extremity with significant discoloration from mid calf to ankle, decreased sensation and circulation  Musculoskeletal: positive findings: Left below mid calf amputation, prosthetic in place  Neurologic: intact, oriented, mood appropriate, judgment intact. Cranial nerves II through XII grossly intact  Diabetic Foot Exam: Positive for significant discoloration to ankle, mildly decreased sensation throughout the ball of the foot, great toe and heel.      Medical decision making/discussion: Patient here for follow-up visit. He is continue care followed the specialists including endocrinology and cardiology. He is to continue his medications as prescribed. He is to follow-up with me in 6 months, with labs done before his visit. I have encouraged him to call the endocrinologist's office to see if he can change his upcoming appointment to an actual in office appointment, if not perhaps that appointment after that, and then all of his follow-up appointments can be via telemedicine. I have offered to place referral to audiology to have his ears reassessed. He is to continue with healthy eating, regular physical activity and continued efforts towards weight loss.    John MOISE was seen today for follow-up.    Diagnoses and all orders for this visit:    Type 2 diabetes mellitus with complication, with long-term current use of insulin (CMS-HCC)  -     COMP METABOLIC PANEL (For Serum Creatinine Topic, choose 1 only); Future  -     Diabetic Monofilament Lower Extremity Exam    Brachial artery occlusion, right (CMS-HCC)    Permanent atrial fibrillation (CMS-HCC)    Amputation of foot, left, traumatic, complicated, sequela  (CMS-[facility])    Essential hypertension    Bilateral hearing loss          Please note that this dictation was created using voice recognition software. I have made every reasonable attempt to correct obvious errors, but I expect that there are errors of grammar and possibly content that I did not discover before finalizing the note.

## 2017-02-10 NOTE — ASSESSMENT & PLAN NOTE
This is chronic in nature, stable  on current regimen. He takes hydralazine 25 mg 3 times a day, valsartan-hydrochlorothiazide 320-12 0.5 mg daily, and amlodipine 5 mg daily. His blood pressure today was 140/58, it was 112/68 in early January. We establish care with me in November, blood pressure was 130/78. He does deny symptoms of hypertension. He reports to me cardiology manages his his medications for this. I did encourage him to discuss this regimen with his cardiologist coming up in April, as there may need to be adjustments made.

## 2017-02-10 NOTE — ASSESSMENT & PLAN NOTE
Patient is significantly hard of hearing. 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 He has been assessed for hearing loss and has been fitted for hearing aids, but he just does not like to wear them. His batteries are always running low and he does not like to replace them. So, he simply just does not wear them. He did see a commercial in which hearing devices caused and only $30, he thinks he would like to try those. I did offer to refer to have his ears reassessed and possibly the refitted for new hearing aids, but he declines and states he will just try the once he saw on TV at this time.

## 2017-02-10 NOTE — ASSESSMENT & PLAN NOTE
This is chronic in nature, stable and well-controlled on current regimen. His last hemoglobin A1c in November was 7.4. At his last visit when he established care with me he was referred to a new provider as his past provider is in [place] and he no longer wanted to travel to [place], wanted to see someone via telemedicine. He has since met that provider and a plan of care has been established. There is been many changes made to his insulin regimen, but ultimately he is now on NovoLog 70/30. I did review the notes, and at some point the endocrinologist wanted to meet the patient in office at least one time to go over the plan of care. I again reiterated this to the patient today and he states that he is agreeable to going in at least once in person to meet the endocrinologist and go over all his medications and his diabetes plan. He states he will call the office sometime today to see if he can change his upcoming telemedicine appointment to an in office appointment in [place]. If he cannot change his upcoming appointment, he will at least try to make the next appointment an in office appointment.  Otherwise, he states he's doing well, tolerating all of his diabetes medications. He just had his eyes examined last month and has a new prescription for glasses. 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 He is due for repeat lab to check his kidney functions, this has been ordered. I did check his right foot, he does have decreased sensation and circulation and significant discoloration about the ankle and calf. He does wear a compression stocking. He sees podiatry he tells me to address his toenails.  I have encouraged him to continue all of his routine medications and keep all of his upcoming appointments with specialists. I will see him back in 6 months with labs done before that visit.

## 2017-02-10 NOTE — ASSESSMENT & PLAN NOTE
Chronic in nature, stable and well-controlled on Coumadin. He is followed by the Coumadin clinic as well. He is also followed by cardiology, and he states he has an upcoming appointment in April.

## 2017-02-10 NOTE — ASSESSMENT & PLAN NOTE
This is chronic in nature, stable and managed by the Coumadin clinic. He states he is also followed by vascular medicine in which he has an upcoming appointment he reports.

## 2017-02-10 NOTE — PATIENT INSTRUCTIONS
Call endocrinologist's office and see if you can make your appointment with him in person to at least meet him one time--then your follow up appointments can be via telemedicine    Keep upcoming appointments with specialists    Follow up with me in 6 months    Labs today

## 2017-02-11 ENCOUNTER — HOSPITAL ENCOUNTER (OUTPATIENT)
Dept: LAB | Facility: MEDICAL CENTER | Age: 82
End: 2017-02-11
Attending: NURSE PRACTITIONER
Payer: MEDICARE

## 2017-02-11 DIAGNOSIS — E11.8 TYPE 2 DIABETES MELLITUS WITH COMPLICATION, WITH LONG-TERM CURRENT USE OF INSULIN (HCC): ICD-10-CM

## 2017-02-11 DIAGNOSIS — Z79.4 TYPE 2 DIABETES MELLITUS WITH COMPLICATION, WITH LONG-TERM CURRENT USE OF INSULIN (HCC): ICD-10-CM

## 2017-02-11 LAB
ALBUMIN SERPL BCP-MCNC: 3.9 G/DL (ref 3.2–4.9)
ALBUMIN/GLOB SERPL: 1.3 G/DL
ALP SERPL-CCNC: 59 U/L (ref 30–99)
ALT SERPL-CCNC: 15 U/L (ref 2–50)
ANION GAP SERPL CALC-SCNC: 9 MMOL/L (ref 0–11.9)
AST SERPL-CCNC: 20 U/L (ref 12–45)
BILIRUB SERPL-MCNC: 1.7 MG/DL (ref 0.1–1.5)
BUN SERPL-MCNC: 29 MG/DL (ref 8–22)
CALCIUM SERPL-MCNC: 9.3 MG/DL (ref 8.5–10.5)
CHLORIDE SERPL-SCNC: 107 MMOL/L (ref 96–112)
CO2 SERPL-SCNC: 26 MMOL/L (ref 20–33)
CREAT SERPL-MCNC: 1.15 MG/DL (ref 0.5–1.4)
GLOBULIN SER CALC-MCNC: 2.9 G/DL (ref 1.9–3.5)
GLUCOSE SERPL-MCNC: 105 MG/DL (ref 65–99)
POTASSIUM SERPL-SCNC: 4.2 MMOL/L (ref 3.6–5.5)
PROT SERPL-MCNC: 6.8 G/DL (ref 6–8.2)
SODIUM SERPL-SCNC: 142 MMOL/L (ref 135–145)

## 2017-02-11 PROCEDURE — 80053 COMPREHEN METABOLIC PANEL: CPT

## 2017-02-11 PROCEDURE — 36415 COLL VENOUS BLD VENIPUNCTURE: CPT

## 2017-02-27 ENCOUNTER — TELEPHONE (OUTPATIENT)
Dept: ENDOCRINOLOGY | Facility: MEDICAL CENTER | Age: 82
End: 2017-02-27

## 2017-02-27 NOTE — TELEPHONE ENCOUNTER
Please forward medication refill request for Valentine to primary care physician or his cardiologist.

## 2017-02-27 NOTE — TELEPHONE ENCOUNTER
1. Caller Name:  John Benjamin                                         Call Back Number:       Patient approves a detailed voicemail message: N\A    Med Refill   Patient called for refill on Diovan

## 2017-03-01 RX ORDER — VALSARTAN AND HYDROCHLOROTHIAZIDE 320; 12.5 MG/1; MG/1
1 TABLET, FILM COATED ORAL DAILY
Qty: 30 TAB | Refills: 3 | Status: SHIPPED | OUTPATIENT
Start: 2017-03-01 | End: 2017-07-14 | Stop reason: SDUPTHER

## 2017-03-03 ENCOUNTER — ANTICOAGULATION VISIT (OUTPATIENT)
Dept: MEDICAL GROUP | Facility: PHYSICIAN GROUP | Age: 82
End: 2017-03-03
Payer: MEDICARE

## 2017-03-03 DIAGNOSIS — I48.91 ATRIAL FIBRILLATION, UNSPECIFIED TYPE (HCC): ICD-10-CM

## 2017-03-03 DIAGNOSIS — I48.21 PERMANENT ATRIAL FIBRILLATION (HCC): ICD-10-CM

## 2017-03-03 LAB — INR PPP: 4 (ref 2–3.5)

## 2017-03-03 PROCEDURE — 85610 PROTHROMBIN TIME: CPT | Performed by: PHYSICIAN ASSISTANT

## 2017-03-03 NOTE — PROGRESS NOTES
Anticoagulation Summary as of 3/3/2017     INR goal 2.0-3.0   Selected INR 4.0! (3/3/2017)   Maintenance plan 6 mg (6 mg x 1) on Mon, Wed, Fri; 3 mg (6 mg x 0.5) all other days   Weekly total 30 mg   Plan last modified Efe Tay, PHARMD (12/12/2016)   Next INR check 3/17/2017   Target end date Indefinite    Indications   Atrial fibrillation (CMS-HCC) [I48.91]  Atrial fibrillation (CMS-HCC) [I48.91]         Anticoagulation Episode Summary     INR check location [facility]    Preferred lab     Send INR reminders to     Comments       Anticoagulation Care Providers     Provider Role Specialty Phone number    Dontae Ng D.O. [facility] Cardiology [phone]    [facility]   [phone]    JOHN Easley.  Family Medicine [phone]        Anticoagulation Patient Findings    John Hernandez seen in clinic today  INR  supra-therapeutic.    Denies signs/symptoms of bleeding and/or thrombosis.    Denies changes to diet or medications.   Follow up appointment in 2 week(s).      Hold today then continue weekly warfarin dose as noted    Keith Collado, PHARMD

## 2017-03-03 NOTE — MR AVS SNAPSHOT
John SUMA Mary   3/3/2017 8:45 AM   Anticoagulation Visit   MRN: [id number]    Department:  [facility]   Dept Phone:  [phone]    Description:  Male : 1934   Provider:  KM ANTI-COAG           Allergies as of 3/3/2017     No Known Allergies      You were diagnosed with     Atrial fibrillation, unspecified type (CMS-HCC)   [1519795]       Permanent atrial fibrillation (CMS-[facility])   [309935]         Vital Signs     Smoking Status                   Never Smoker            Basic Information     Date Of Birth Sex Race Ethnicity Preferred Language    1934 Male White Non- English      Your appointments     Mar 03, 2017  8:45 AM   Anti-Coag Routine with [place] ANTI-COAG   [facility]    [address] [place] [address] [phone]            Mar 17, 2017  8:15 AM   Anti-Coag Routine with [place] ANTI-COAG   [facility]    [address] [place] [address] [phone]            Mar 24, 2017 10:00 AM   Telemedicine Clinic Established Pt with Sky Malloy M.D., TELEMED ENDOCRINOLOGY MD, TELEMEDICINE [facility] [address] [place] [address], [address]  Former[facility] [address] [phone]            Aug 04, 2017  8:20 AM   Established Patient with AIYANA Easley   [facility]    [address] [place] [address] [phone]           You will be receiving a confirmation call a few days before your appointment from our automated call confirmation system.              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   5/3/2013 - Present    Hypertension I10   5/3/2013 - Present    Type 2 diabetes mellitus with complication (CMS-HCC) E11.8   5/3/2013 - Present    PVD (peripheral vascular disease) (CMS-HCC) I73.9   5/3/2013 - Present       Atrial fibrillation (CMS-HCC) I48.91   5/3/2013 - Present    Esophageal reflux K21.9   5/3/2013 - Present    Dyslipidemia E78.5   5/3/2013 - Present    Atrial fibrillation (CMS-HCC) I48.91   9/25/2015 - Present    Amputation of foot, left, traumatic, 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, 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   11/9/2016 - Present    Diabetic complication (CMS-HCC) E11.8   1/5/2017 - Present    Bilateral hearing loss H91.93   2/10/2017 - Present      Health Maintenance        Date Due Completion Dates    RETINAL SCREENING 7/27/1952 ---    IMM DTaP/Tdap/Td Vaccine (1 - Tdap) 7/27/1953 ---    IMM PNEUMOCOCCAL 65+ (ADULT) LOW/MEDIUM RISK SERIES (1 of 2 - PCV13) 7/27/1999 ---    A1C SCREENING 5/15/2017 11/15/2016, 10/12/2006, 7/6/2006, 4/5/2006, 1/3/2006    FASTING LIPID PROFILE 11/15/2017 11/15/2016, 10/12/2006, 7/6/2006, 4/5/2006    URINE ACR / MICROALBUMIN 11/15/2017 11/15/2016, 1/3/2006    DIABETES MONOFILAMENT / LE EXAM 2/10/2018 2/10/2017    SERUM CREATININE 2/11/2018 2/11/2017, 5/4/2013, 4/5/2006            Results     POCT Protime      Component    INR    4.0    Comment:     ic valid [id number] 160 exp 11/2017                        Current Immunizations     Influenza TIV (IM) 11/1/2012    Influenza Vaccine Adult HD 8/19/2016    SHINGLES VACCINE 11/3/2016      Below and/or attached are the medications your provider expects you to take. Review all of your home medications and newly ordered medications with your provider and/or pharmacist. Follow medication instructions as directed by your provider and/or pharmacist. Please keep your medication list with you and share with your provider. Update the information when medications are discontinued, doses are changed, or new medications (including over-the-counter products) are added; and carry medication information at all times in the event of emergency situations     Allergies:  No Known Allergies          Medications  Valid as of: March 03, 2017 -  8:35 AM    Generic Name Brand Name  "Tablet Size Instructions for use    AmLODIPine Besylate (Tab) NORVASC 5 MG Take 5 mg by mouth every day.        Cholecalciferol (Tab) cholecalciferol 1000 UNIT Take 1,000 Units by mouth every day.        Finasteride (Tab) PROSCAR 5 MG Take 5 mg by mouth every day.        Furosemide (Tab) LASIX 40 MG Take 40 mg by mouth every day.        HydrALAZINE HCl (Tab) APRESOLINE 25 MG Take 25 mg by mouth 3 times a day.        Insulin NPH Isophane & Regular (Suspension) HUMULIN,NOVOLIN (70-30) 100 UNIT/ML Inject 18 Units as instructed 3 times a day before meals.        Insulin Syringe-Needle U-100 (Misc) INSULIN SYRINGE .5CC/31GX5/16\" 31G X 5/16\" 0.5 ML For insulin injections 3 times a day        Omeprazole (CAPSULE DELAYED RELEASE) PRILOSEC 20 MG Take 2 Caps by mouth every day.        Pioglitazone HCl (Tab) ACTOS 15 MG Take 1 Tab by mouth every day.        Potassium Chloride (Tab CR) KLOR-CON 10 MEQ Take 20 mEq by mouth every day.        Pravastatin Sodium (Tab) PRAVACHOL 20 MG Take 80 mg by mouth every evening.        Valsartan-Hydrochlorothiazide (Tab) DIOVAN--12.5 MG Take 0.5 Tabs by mouth every day.        Valsartan-Hydrochlorothiazide (Tab) DIOVAN--12.5 MG Take 1 Tab by mouth every day.        Warfarin Sodium (Tab) COUMADIN 6 MG Take 1 Tab by mouth every day at 6 PM.        .                 Medicines prescribed today were sent to:     [address] [address]    Phone: [phone] Fax: [phone]    Open 24 Hours?: No      Medication refill instructions:       If your prescription bottle indicates you have medication refills left, it is not necessary to call your provider’s office. Please contact your pharmacy and they will refill your medication.    If your prescription bottle indicates you do not have any refills left, you may request refills at any time through one of the following ways: The online Dnevnik system " (except Urgent Care), by calling your provider’s office, or by asking your pharmacy to contact your provider’s office with a refill request. Medication refills are processed only during regular business hours and may not be available until the next business day. Your provider may request additional information or to have a follow-up visit with you prior to refilling your medication.   *Please Note: Medication refills are assigned a new Rx number when refilled electronically. Your pharmacy may indicate that no refills were authorized even though a new prescription for the same medication is available at the pharmacy. Please request the medicine by name with the pharmacy before contacting your provider for a refill.        Warfarin Dosing Calendar   March 2017 Details    Sun Mon Tue Wed Thu Fri Sat        1               2               3   4.0   Hold   See details      4      3 mg           5      3 mg         6      6 mg         7      3 mg         8      6 mg         9      3 mg         10      6 mg         11      3 mg           12      3 mg         13      6 mg         14      3 mg         15      6 mg         16      3 mg         17      6 mg         18                 19               20               21               22               23               24               25                 26               27               28               29               30               31                 Date Details   03/03 This INR check   INR: 4.0    valid [id number] 160 exp 11/2017       Date of next INR:  3/17/2017         How to take your warfarin dose     To take:  3 mg Take 0.5 of a 6 mg tablet.    To take:  6 mg Take 1 of the 6 mg tablets.    Hold Do not take your warfarin dose. See the Details table to the right for additional instructions.                   [employer] Access Code: [email or web address]  Expires: 4/2/2017  8:35 AM    [employer]  A secure, online tool to manage your health information     [employer]’s  (In)Touch Network® is a secure, online tool that connects you to your personalized health information from the privacy of your home -- day or night - making it very easy for you to manage your healthcare. Once the activation process is completed, you can even access your medical information using the (In)Touch Network mallorie, which is available for free in the Apple Mallorie store or Google Play store.     (In)Touch Network provides the following levels of access (as shown below):   My Chart Features   [facility] Primary Care Doctor [facility]  Specialists [facility]  Urgent  Care Non-[facility]  Primary Care  Doctor   Email your healthcare team securely and privately 24/7 X X X    Manage appointments: schedule your next appointment; view details of past/upcoming appointments X      Request prescription refills. X      View recent personal medical records, including lab and immunizations X X X X   View health record, including health history, allergies, medications X X X X   Read reports about your outpatient visits, procedures, consult and ER notes X X X X   See your discharge summary, which is a recap of your hospital and/or ER visit that includes your diagnosis, lab results, and care plan. X X       How to register for (In)Touch Network:  1. Go to  [email or web address].  2. Click on the Sign Up Now box, which takes you to the New Member Sign Up page. You will need to provide the following information:  a. Enter your (In)Touch Network Access Code exactly as it appears at the top of this page. (You will not need to use this code after you’ve completed the sign-up process. If you do not sign up before the expiration date, you must request a new code.)   b. Enter your date of birth.   c. Enter your home email address.   d. Click Submit, and follow the next screen’s instructions.  3. Create a (In)Touch Network ID. This will be your (In)Touch Network login ID and cannot be changed, so think of one that is secure and easy to remember.  4. Create a (In)Touch Network password. You can change your password at any  time.  5. Enter your Password Reset Question and Answer. This can be used at a later time if you forget your password.   6. Enter your e-mail address. This allows you to receive e-mail notifications when new information is available in [employer].  7. Click Sign Up. You can now view your health information.    For assistance activating your [employer] account, call [phone]

## 2017-03-17 ENCOUNTER — ANTICOAGULATION VISIT (OUTPATIENT)
Dept: MEDICAL GROUP | Facility: PHYSICIAN GROUP | Age: 82
End: 2017-03-17
Payer: MEDICARE

## 2017-03-17 VITALS — HEART RATE: 75 BPM | SYSTOLIC BLOOD PRESSURE: 120 MMHG | DIASTOLIC BLOOD PRESSURE: 68 MMHG

## 2017-03-17 DIAGNOSIS — I48.91 ATRIAL FIBRILLATION, UNSPECIFIED TYPE (HCC): ICD-10-CM

## 2017-03-17 DIAGNOSIS — I48.21 PERMANENT ATRIAL FIBRILLATION (HCC): ICD-10-CM

## 2017-03-17 DIAGNOSIS — Z79.01 LONG TERM (CURRENT) USE OF ANTICOAGULANTS: ICD-10-CM

## 2017-03-17 LAB — INR PPP: 4 (ref 2–3.5)

## 2017-03-17 PROCEDURE — 85610 PROTHROMBIN TIME: CPT | Performed by: PHYSICIAN ASSISTANT

## 2017-03-17 NOTE — PROGRESS NOTES
Anticoagulation Summary as of 3/17/2017     INR goal 2.0-3.0   Selected INR 4.0! (3/17/2017)   Maintenance plan 6 mg (6 mg x 1) on Wed, Fri; 3 mg (6 mg x 0.5) all other days   Weekly total 27 mg   Plan last modified Keith Collado, PHARMD (3/17/2017)   Next INR check 3/24/2017   Target end date Indefinite    Indications   Atrial fibrillation (CMS-HCC) [I48.91]  Long term (current) use of anticoagulants [Z79.01] [Z79.01]         Anticoagulation Episode Summary     INR check location [facility]    Preferred lab     Send INR reminders to     Comments TTR is 45.05%, consider alternative      Anticoagulation Care Providers     Provider Role Specialty Phone number    Dontae Ng D.O. Referring Cardiology [phone]    [facility] Anticoagulation Services   [phone]    AIYANA Easley  Family Medicine [phone]        Anticoagulation Patient Findings    John Hernandez seen in clinic today  INR  supra-therapeutic again after holding a dose at the last appt.   Denies signs/symptoms of bleeding and/or thrombosis.    Denies changes to diet or medications.   Follow up appointment in 1 week(s).    Hold today then decrease weekly warfarin dose as noted    Keith Collado, KYRIED

## 2017-03-17 NOTE — MR AVS SNAPSHOT
John Hernandez   3/17/2017 8:15 AM   Anticoagulation Visit   MRN: [id number]    Department:  [facility]   Dept Phone:  [phone]    Description:  Male : 1934   Provider:  KM ANTI-COAG           Allergies as of 3/17/2017     No Known Allergies      You were diagnosed with     Atrial fibrillation, unspecified type (CMS-HCC)   [7924623]       Permanent atrial fibrillation (CMS-HCC)   [999192]       Long term (current) use of anticoagulants   [V58.61.ICD-9-CM]         Vital Signs     Smoking Status                   Never Smoker            Basic Information     Date Of Birth Sex Race Ethnicity Preferred Language    1934 Male White Non- English      Your appointments     Mar 17, 2017  8:15 AM   Anti-Coag Routine with [place] ANTI-COAG   [facility] ([place]    [address] [place] [address] [phone]            Mar 24, 2017  9:30 AM   Anti-Coag Routine with [place] ANTI-COAG   [facility]    [address] [place] [address] [phone]            Mar 24, 2017 10:00 AM   Telemedicine Clinic Established Pt with Sky Malloy M.D., TELEMED ENDOCRINOLOGY MD, TELEMEDICINE [facility]    [id number] [place] [address], [address]  [facility] [id number] [phone]            Aug 04, 2017  8:20 AM   Established Patient with AIYANA Easley   [facility]    [address] [place] [address] [phone]           You will be receiving a confirmation call a few days before your appointment from our automated call confirmation system.              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   5/3/2013 - Present    Hypertension I10   5/3/2013 - Present    Type 2 diabetes mellitus with complication (CMS-[facility]) E11.8   5/3/2013 - Present    PVD (peripheral vascular disease) (CMS-HCC) I73.9   5/3/2013 - Present    Atrial fibrillation (CMS-HCC) I48.91   5/3/2013 - Present    Esophageal reflux K21.9   5/3/2013 - Present    Dyslipidemia E78.5   5/3/2013 - Present    Atrial fibrillation (CMS-HCC) I48.91   9/25/2015 - Present    Amputation of foot, left, traumatic, 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, 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.9   11/9/2016 - Present    Diabetic complication (CMS-HCC) E11.8   1/5/2017 - Present    Bilateral hearing loss H91.93   2/10/2017 - Present    Long term (current) use of anticoagulants [Z79.01] Z79.01   3/17/2017 - Present      Health Maintenance        Date Due Completion Dates    RETINAL SCREENING 7/27/1952 ---    IMM DTaP/Tdap/Td Vaccine (1 - Tdap) 7/27/1953 ---    IMM PNEUMOCOCCAL 65+ (ADULT) LOW/MEDIUM RISK SERIES (1 of 2 - PCV13) 7/27/1999 ---    A1C SCREENING 5/15/2017 11/15/2016, 10/12/2006, 7/6/2006, 4/5/2006, 1/3/2006    FASTING LIPID PROFILE 11/15/2017 11/15/2016, 10/12/2006, 7/6/2006, 4/5/2006    URINE ACR / MICROALBUMIN 11/15/2017 11/15/2016, 1/3/2006    DIABETES MONOFILAMENT / LE EXAM 2/10/2018 2/10/2017    SERUM CREATININE 2/11/2018 2/11/2017, 5/4/2013, 4/5/2006            Results     POCT Protime      Component    INR    4.0    Comment:     ic valid [id number] 160 exp 10/2017                        Current Immunizations     Influenza TIV (IM) 11/1/2012    Influenza Vaccine Adult HD 8/19/2016    SHINGLES VACCINE 11/3/2016      Below and/or attached are the medications your provider expects you to take. Review all of your home medications and newly ordered medications with your provider and/or pharmacist. Follow medication instructions as directed by your provider and/or pharmacist. Please keep your medication list with you and share with your provider. Update the information when medications are discontinued, doses are changed, or new medications (including over-the-counter products) are added; and carry medication information at all times in the  "event of emergency situations     Allergies:  No Known Allergies          Medications  Valid as of: March 17, 2017 -  8:13 AM    Generic Name Brand Name Tablet Size Instructions for use    AmLODIPine Besylate (Tab) NORVASC 5 MG Take 5 mg by mouth every day.        Cholecalciferol (Tab) cholecalciferol 1000 UNIT Take 1,000 Units by mouth every day.        Finasteride (Tab) PROSCAR 5 MG Take 5 mg by mouth every day.        Furosemide (Tab) LASIX 40 MG Take 40 mg by mouth every day.        HydrALAZINE HCl (Tab) APRESOLINE 25 MG Take 25 mg by mouth 3 times a day.        Insulin NPH Isophane & Regular (Suspension) HUMULIN,NOVOLIN (70-30) 100 UNIT/ML Inject 18 Units as instructed 3 times a day before meals.        Insulin Syringe-Needle U-100 (Misc) INSULIN SYRINGE .5CC/31GX5/16\" 31G X 5/16\" 0.5 ML For insulin injections 3 times a day        Omeprazole (CAPSULE DELAYED RELEASE) PRILOSEC 20 MG Take 2 Caps by mouth every day.        Pioglitazone HCl (Tab) ACTOS 15 MG Take 1 Tab by mouth every day.        Potassium Chloride (Tab CR) KLOR-CON 10 MEQ Take 20 mEq by mouth every day.        Pravastatin Sodium (Tab) PRAVACHOL 20 MG Take 80 mg by mouth every evening.        Valsartan-Hydrochlorothiazide (Tab) DIOVAN--12.5 MG Take 0.5 Tabs by mouth every day.        Valsartan-Hydrochlorothiazide (Tab) DIOVAN--12.5 MG Take 1 Tab by mouth every day.        Warfarin Sodium (Tab) COUMADIN 6 MG Take 1 Tab by mouth every day at 6 PM.        .                 Medicines prescribed today were sent to:     [facility] [address] [place] [address] [facility]    [address] [facility] [address]    Phone: [phone] Fax: [phone]    Open 24 Hours?: No      Medication refill instructions:       If your prescription bottle indicates you have medication refills left, it is not necessary to call your provider’s office. Please contact your pharmacy and they will refill your medication.    If your prescription " bottle indicates you do not have any refills left, you may request refills at any time through one of the following ways: The online Attensa system (except Urgent Care), by calling your provider’s office, or by asking your pharmacy to contact your provider’s office with a refill request. Medication refills are processed only during regular business hours and may not be available until the next business day. Your provider may request additional information or to have a follow-up visit with you prior to refilling your medication.   *Please Note: Medication refills are assigned a new Rx number when refilled electronically. Your pharmacy may indicate that no refills were authorized even though a new prescription for the same medication is available at the pharmacy. Please request the medicine by name with the pharmacy before contacting your provider for a refill.        Warfarin Dosing Calendar   March 2017 Details    Sun Mon Tue Wed Thu Fri Sat        1               2               3               4                 5               6               7               8               9               10               11                 12               13               14               15               16               17   4.0   Hold   See details      18      3 mg           19      3 mg         20      3 mg         21      3 mg         22      6 mg         23      3 mg         24      6 mg         25                 26               27               28               29               30               31                 Date Details   03/17 This INR check   INR: 4.0    valid [id number] 160 exp 10/2017       Date of next INR:  3/24/2017         How to take your warfarin dose     To take:  3 mg Take 0.5 of a 6 mg tablet.    To take:  6 mg Take 1 of the 6 mg tablets.    Hold Do not take your warfarin dose. See the Details table to the right for additional instructions.                   Attensa Access Code:  [email or web address]  Expires: 4/2/2017  9:35 AM    Ca[employer]  A secure, online tool to manage your health information     [employer]’s Capeco® is a secure, online tool that connects you to your personalized health information from the privacy of your home -- day or night - making it very easy for you to manage your healthcare. Once the activation process is completed, you can even access your medical information using the Capeco mallorie, which is available for free in the Apple Mallorie store or Google Play store.     Capeco provides the following levels of access (as shown below):   My Chart Features   [facility] Primary Care Doctor [facility]  Specialists [facility]  Urgent  Care Non-[facility]  Primary Care  Doctor   Email your healthcare team securely and privately 24/7 X X X    Manage appointments: schedule your next appointment; view details of past/upcoming appointments X      Request prescription refills. X      View recent personal medical records, including lab and immunizations X X X X   View health record, including health history, allergies, medications X X X X   Read reports about your outpatient visits, procedures, consult and ER notes X X X X   See your discharge summary, which is a recap of your hospital and/or ER visit that includes your diagnosis, lab results, and care plan. X X       How to register for Capeco:  1. Go to  [email or web address].  2. Click on the Sign Up Now box, which takes you to the New Member Sign Up page. You will need to provide the following information:  a. Enter your Capeco Access Code exactly as it appears at the top of this page. (You will not need to use this code after you’ve completed the sign-up process. If you do not sign up before the expiration date, you must request a new code.)   b. Enter your date of birth.   c. Enter your home email address.   d. Click Submit, and follow the next screen’s instructions.  3. Create a Capeco ID. This will be your Capeco login ID and cannot be  changed, so think of one that is secure and easy to remember.  4. Create a [employer] password. You can change your password at any time.  5. Enter your Password Reset Question and Answer. This can be used at a later time if you forget your password.   6. Enter your e-mail address. This allows you to receive e-mail notifications when new information is available in [employer].  7. Click Sign Up. You can now view your health information.    For assistance activating your [employer] account, call [phone]

## 2017-03-24 ENCOUNTER — TELEMEDICINE ORIGINATING SITE VISIT (OUTPATIENT)
Dept: MEDICAL GROUP | Facility: PHYSICIAN GROUP | Age: 82
End: 2017-03-24
Payer: MEDICARE

## 2017-03-24 ENCOUNTER — ANTICOAGULATION VISIT (OUTPATIENT)
Dept: MEDICAL GROUP | Facility: PHYSICIAN GROUP | Age: 82
End: 2017-03-24
Payer: MEDICARE

## 2017-03-24 ENCOUNTER — TELEMEDICINE2 (OUTPATIENT)
Dept: ENDOCRINOLOGY | Facility: MEDICAL CENTER | Age: 82
End: 2017-03-24
Payer: MEDICARE

## 2017-03-24 VITALS
HEIGHT: 71 IN | OXYGEN SATURATION: 94 % | WEIGHT: 228 LBS | DIASTOLIC BLOOD PRESSURE: 70 MMHG | HEART RATE: 54 BPM | RESPIRATION RATE: 12 BRPM | BODY MASS INDEX: 31.92 KG/M2 | SYSTOLIC BLOOD PRESSURE: 154 MMHG

## 2017-03-24 VITALS — SYSTOLIC BLOOD PRESSURE: 130 MMHG | HEART RATE: 44 BPM | DIASTOLIC BLOOD PRESSURE: 65 MMHG

## 2017-03-24 DIAGNOSIS — E78.2 MIXED HYPERLIPIDEMIA: ICD-10-CM

## 2017-03-24 DIAGNOSIS — Z79.01 LONG TERM (CURRENT) USE OF ANTICOAGULANTS: ICD-10-CM

## 2017-03-24 DIAGNOSIS — I10 ESSENTIAL HYPERTENSION: ICD-10-CM

## 2017-03-24 DIAGNOSIS — E11.65 TYPE 2 DIABETES MELLITUS WITH HYPERGLYCEMIA, WITH LONG-TERM CURRENT USE OF INSULIN (HCC): ICD-10-CM

## 2017-03-24 DIAGNOSIS — Z79.4 TYPE 2 DIABETES MELLITUS WITH HYPERGLYCEMIA, WITH LONG-TERM CURRENT USE OF INSULIN (HCC): ICD-10-CM

## 2017-03-24 DIAGNOSIS — I48.91 ATRIAL FIBRILLATION, UNSPECIFIED TYPE (HCC): ICD-10-CM

## 2017-03-24 DIAGNOSIS — E11.8 TYPE 2 DIABETES MELLITUS WITH COMPLICATION, WITH LONG-TERM CURRENT USE OF INSULIN (HCC): ICD-10-CM

## 2017-03-24 DIAGNOSIS — Z79.4 TYPE 2 DIABETES MELLITUS WITH COMPLICATION, WITH LONG-TERM CURRENT USE OF INSULIN (HCC): ICD-10-CM

## 2017-03-24 LAB — INR PPP: 2.3 (ref 2–3.5)

## 2017-03-24 PROCEDURE — 99214 OFFICE O/P EST MOD 30 MIN: CPT | Mod: GT | Performed by: INTERNAL MEDICINE

## 2017-03-24 PROCEDURE — 85610 PROTHROMBIN TIME: CPT | Performed by: PHYSICIAN ASSISTANT

## 2017-03-24 NOTE — MR AVS SNAPSHOT
John SUMA Mary   3/24/2017 9:30 AM   Anticoagulation Visit   MRN: [id number]    Department:  [facility]   Dept Phone:  [phone]    Description:  Male : 1934   Provider:  Efe Tay, PHARMD           Allergies as of 3/24/2017     No Known Allergies      You were diagnosed with     Long term (current) use of anticoagulants   [V58.61.ICD-9-CM]       Atrial fibrillation, unspecified type (CMS-HCC)   [7649874]         Vital Signs     Blood Pressure Pulse Smoking Status             130/65 mmHg 44 Never Smoker          Basic Information     Date Of Birth Sex Race Ethnicity Preferred Language    1934 Male White Non- English      Your appointments     Mar 24, 2017 10:00 AM   Telemedicine Clinic Established Pt with Sky Malloy M.D., TELEMED ENDOCRINOLOGY MD, TELEMEDICINE [facility]    [address] [facility], [address]  [facility] [id number] [phone]            2017  8:30 AM   Anti-Coag Routine with [place] ANTI-COAG   [facility]    [address] [place] [address] [phone]            Aug 04, 2017  8:20 AM   Established Patient with AIYANA Easley   [facility]    [address] [place] [address] [phone]           You will be receiving a confirmation call a few days before your appointment from our automated call confirmation system.              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   5/3/2013 - Present    Hypertension I10   5/3/2013 - Present    Type 2 diabetes mellitus with complication (CMS-HCC) E11.8   5/3/2013 - Present    PVD (peripheral vascular disease) (CMS-HCC) I73.9   5/3/2013 - Present    Atrial fibrillation (CMS-HCC) I48.91   5/3/2013 - Present    Esophageal reflux K21.9   5/3/2013 - Present    Dyslipidemia E78.5    5/3/2013 - Present    Atrial fibrillation (CMS-HCC) I48.91   9/25/2015 - Present    Amputation of foot, left, traumatic, 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, 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   11/9/2016 - Present    Diabetic complication (CMS-HCC) E11.8   1/5/2017 - Present    Bilateral hearing loss H91.93   2/10/2017 - Present    Long term (current) use of anticoagulants [Z79.01] Z79.01   3/17/2017 - Present      Health Maintenance        Date Due Completion Dates    RETINAL SCREENING 7/27/1952 ---    IMM DTaP/Tdap/Td Vaccine (1 - Tdap) 7/27/1953 ---    IMM PNEUMOCOCCAL 65+ (ADULT) LOW/MEDIUM RISK SERIES (1 of 2 - PCV13) 7/27/1999 ---    A1C SCREENING 5/15/2017 11/15/2016, 10/12/2006, 7/6/2006, 4/5/2006, 1/3/2006    FASTING LIPID PROFILE 11/15/2017 11/15/2016, 10/12/2006, 7/6/2006, 4/5/2006    URINE ACR / MICROALBUMIN 11/15/2017 11/15/2016, 1/3/2006    DIABETES MONOFILAMENT / LE EXAM 2/10/2018 2/10/2017    SERUM CREATININE 2/11/2018 2/11/2017, 5/4/2013, 4/5/2006            Results     POCT Protime      Component    INR    2.3    Comment:     Lot: 128 037-12 Exp: 10/17 IC Valid                        Current Immunizations     Influenza TIV (IM) 11/1/2012    Influenza Vaccine Adult HD 8/19/2016    SHINGLES VACCINE 11/3/2016      Below and/or attached are the medications your provider expects you to take. Review all of your home medications and newly ordered medications with your provider and/or pharmacist. Follow medication instructions as directed by your provider and/or pharmacist. Please keep your medication list with you and share with your provider. Update the information when medications are discontinued, doses are changed, or new medications (including over-the-counter products) are added; and carry medication information at all times in the event of emergency situations     Allergies:  No Known Allergies          Medications  Valid as of: March 24, 2017 -  9:37 AM    Generic Name Brand Name Tablet Size Instructions for use    AmLODIPine  "Besylate (Tab) NORVASC 5 MG Take 5 mg by mouth every day.        Cholecalciferol (Tab) cholecalciferol 1000 UNIT Take 1,000 Units by mouth every day.        Finasteride (Tab) PROSCAR 5 MG Take 5 mg by mouth every day.        Furosemide (Tab) LASIX 40 MG Take 40 mg by mouth every day.        HydrALAZINE HCl (Tab) APRESOLINE 25 MG Take 25 mg by mouth 3 times a day.        Insulin NPH Isophane & Regular (Suspension) HUMULIN,NOVOLIN (70-30) 100 UNIT/ML Inject 18 Units as instructed 3 times a day before meals.        Insulin Syringe-Needle U-100 (Misc) INSULIN SYRINGE .5CC/31GX5/16\" 31G X 5/16\" 0.5 ML For insulin injections 3 times a day        Omeprazole (CAPSULE DELAYED RELEASE) PRILOSEC 20 MG Take 2 Caps by mouth every day.        Pioglitazone HCl (Tab) ACTOS 15 MG Take 1 Tab by mouth every day.        Potassium Chloride (Tab CR) KLOR-CON 10 MEQ Take 20 mEq by mouth every day.        Pravastatin Sodium (Tab) PRAVACHOL 20 MG Take 80 mg by mouth every evening.        Valsartan-Hydrochlorothiazide (Tab) DIOVAN--12.5 MG Take 0.5 Tabs by mouth every day.        Valsartan-Hydrochlorothiazide (Tab) DIOVAN--12.5 MG Take 1 Tab by mouth every day.        Warfarin Sodium (Tab) COUMADIN 6 MG Take 1 Tab by mouth every day at 6 PM.        .                 Medicines prescribed today were sent to:     [facility] [address] [place] [id number]    Phone: [phone] Fax: [phone]    Open 24 Hours?: No      Medication refill instructions:       If your prescription bottle indicates you have medication refills left, it is not necessary to call your provider’s office. Please contact your pharmacy and they will refill your medication.    If your prescription bottle indicates you do not have any refills left, you may request refills at any time through one of the following ways: The online [employer] system (except Urgent Care), by calling your provider’s " office, or by asking your pharmacy to contact your provider’s office with a refill request. Medication refills are processed only during regular business hours and may not be available until the next business day. Your provider may request additional information or to have a follow-up visit with you prior to refilling your medication.   *Please Note: Medication refills are assigned a new Rx number when refilled electronically. Your pharmacy may indicate that no refills were authorized even though a new prescription for the same medication is available at the pharmacy. Please request the medicine by name with the pharmacy before contacting your provider for a refill.        Warfarin Dosing Calendar March 2017 Details    Sun Mon Tue Wed Thu Fri Sat        1               2               3               4                 5               6               7               8               9               10               11                 12               13               14               15               16               17               18                 19               20               21               22               23               24   2.3   6 mg   See details      25      3 mg           26      3 mg         27      3 mg         28      3 mg         29      6 mg         30      3 mg         31      6 mg           Date Details   03/24 This INR check   INR: 2.3   Lot: 128 037-12 Exp: 10/17 IC Valid               How to take your warfarin dose     To take:  3 mg Take 0.5 of a 6 mg tablet.    To take:  6 mg Take 1 of the 6 mg tablets.           Warfarin Dosing Calendar April 2017 Details    Sun Mon Tue Wed Thu Fri Sat           1      3 mg           2      3 mg         3      3 mg         4      3 mg         5      6 mg         6      3 mg         7      6 mg         8                 9               10               11               12               13               14               15                 16                  17               18               19               20               21               22                 23               24               25               26               27               28               29                 30                      Date Details   No additional details    Date of next INR:  4/7/2017         How to take your warfarin dose     To take:  3 mg Take 0.5 of a 6 mg tablet.    To take:  6 mg Take 1 of the 6 mg tablets.              EarlyDoc Access Code: [email or web address]  Expires: 4/2/2017  9:35 AM    EarlyDoc  A secure, online tool to manage your health information     [employer]’s EarlyDoc® is a secure, online tool that connects you to your personalized health information from the privacy of your home -- day or night - making it very easy for you to manage your healthcare. Once the activation process is completed, you can even access your medical information using the EarlyDoc mallorie, which is available for free in the Apple Mallorie store or Google Play store.     EarlyDoc provides the following levels of access (as shown below):   My Chart Features   [facility] Primary Care Doctor [facility]  Specialists [facility]  Urgent  Care [facility]  Primary Care  Doctor   Email your healthcare team securely and privately 24/7 X X X    Manage appointments: schedule your next appointment; view details of past/upcoming appointments X      Request prescription refills. X      View recent personal medical records, including lab and immunizations X X X X   View health record, including health history, allergies, medications X X X X   Read reports about your outpatient visits, procedures, consult and ER notes X X X X   See your discharge summary, which is a recap of your hospital and/or ER visit that includes your diagnosis, lab results, and care plan. X X       How to register for EarlyDoc:  1. Go to  [email or web address].  2. Click on the Sign Up Now box, which takes you to the New Member Sign Up page. You  will need to provide the following information:  a. Enter your Klooff Access Code exactly as it appears at the top of this page. (You will not need to use this code after you’ve completed the sign-up process. If you do not sign up before the expiration date, you must request a new code.)   b. Enter your date of birth.   c. Enter your home email address.   d. Click Submit, and follow the next screen’s instructions.  3. Create a Media Lanternt ID. This will be your Klooff login ID and cannot be changed, so think of one that is secure and easy to remember.  4. Create a Klooff password. You can change your password at any time.  5. Enter your Password Reset Question and Answer. This can be used at a later time if you forget your password.   6. Enter your e-mail address. This allows you to receive e-mail notifications when new information is available in Klooff.  7. Click Sign Up. You can now view your health information.    For assistance activating your Klooff account, call [phone]

## 2017-03-24 NOTE — PROGRESS NOTES
Anticoagulation Summary as of 3/24/2017     INR goal 2.0-3.0   Selected INR 2.3 (3/24/2017)   Maintenance plan 6 mg (6 mg x 1) on Wed, Fri; 3 mg (6 mg x 0.5) all other days   Weekly total 27 mg   No change documented Efe Tay, PHARMTOMER   Plan last modified Keith Collado PHARMD (3/17/2017)   Next INR check 4/7/2017   Target end date Indefinite    Indications   Atrial fibrillation (CMS-HCC) [I48.91]  Long term (current) use of anticoagulants [Z79.01] [Z79.01]         Anticoagulation Episode Summary     INR check location [facility]    Preferred lab     Send INR reminders to     Comments TTR is 45.05%, consider alternative      Anticoagulation Care Providers     Provider Role Specialty Phone number    Dontae Ng D.O. Referring Cardiology [phone]    [facility] Anticoagulation Services   [phone]    AIYANA Easley  Family Medicine [phone]        Anticoagulation Patient Findings    No current signs of bleeding or thrombosis. No interval medication changes. Continue current dose of warfarin. Follow up INR in 2 weeks. Bradycardiac. Sometimes dizzy. Will contact Cardiology for evaluation.    Efe Tay, PHARMD

## 2017-03-24 NOTE — MR AVS SNAPSHOT
"        John Hernandez   3/24/2017 10:00 AM   Telemedicine2   MRN: [id number]    Department:  Endocrinology [facility]   Dept Phone:  [phone]    Description:  Male : 1934   Provider:  Sky Malloy M.D.; TELEMEDICINE K[facility]; TELEMED ENDOCRINOLOGY MD           Reason for Visit     Follow-Up Jbhdvze-Wfomezu-Qp      Allergies as of 3/24/2017     No Known Allergies      You were diagnosed with     Type 2 diabetes mellitus with hyperglycemia, with long-term current use of insulin (CMS-HCC)   [9653255]       Essential hypertension   [3683594]       Mixed hyperlipidemia   [272.2.ICD-9-CM]         Vital Signs     Blood Pressure Pulse Respirations Height Weight Body Mass Index    154/70 mmHg 54 12 1.791 m (5' 10.5\") 103.42 kg (228 lb) 32.24 kg/m2    Oxygen Saturation Smoking Status                94% Never Smoker           Basic Information     Date Of Birth Sex Race Ethnicity Preferred Language    1934 Male White Non- English      Your appointments     2017  8:30 AM   Anti-Coag Routine with [place] ANTI-COAG   [facility]    [address] [place] [address] [place] [address] [phone]            Aug 04, 2017  8:20 AM   Established Patient with AIYANA Easley   [facility]    [address] [place] [address] [place] [id number] [phone]           You will be receiving a confirmation call a few days before your appointment from our automated call confirmation system.              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   5/3/2013 - Present    Hypertension I10   5/3/2013 - Present    Type 2 diabetes mellitus with complication (CMS-HCC) E11.8   5/3/2013 - Present    PVD (peripheral vascular disease) (CMS-HCC) I73.9   5/3/2013 - Present    Atrial fibrillation (CMS-HCC) I48.91   5/3/2013 - Present    Esophageal reflux K21.9   5/3/2013 - Present   " Dyslipidemia E78.5   5/3/2013 - Present    Atrial fibrillation (CMS-HCC) I48.91   9/25/2015 - Present    Amputation of foot, left, traumatic, 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, 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.9   11/9/2016 - Present    Diabetic complication (CMS-HCC) E11.8   1/5/2017 - Present    Bilateral hearing loss H91.93   2/10/2017 - Present    Long term (current) use of anticoagulants [Z79.01] Z79.01   3/17/2017 - Present      Health Maintenance        Date Due Completion Dates    RETINAL SCREENING 7/27/1952 ---    IMM DTaP/Tdap/Td Vaccine (1 - Tdap) 7/27/1953 ---    IMM PNEUMOCOCCAL 65+ (ADULT) LOW/MEDIUM RISK SERIES (1 of 2 - PCV13) 7/27/1999 ---    A1C SCREENING 5/15/2017 11/15/2016, 10/12/2006, 7/6/2006, 4/5/2006, 1/3/2006    FASTING LIPID PROFILE 11/15/2017 11/15/2016, 10/12/2006, 7/6/2006, 4/5/2006    URINE ACR / MICROALBUMIN 11/15/2017 11/15/2016, 1/3/2006    DIABETES MONOFILAMENT / LE EXAM 2/10/2018 2/10/2017    SERUM CREATININE 2/11/2018 2/11/2017, 5/4/2013, 4/5/2006            Current Immunizations     Influenza TIV (IM) 11/1/2012    Influenza Vaccine Adult HD 8/19/2016    SHINGLES VACCINE 11/3/2016      Below and/or attached are the medications your provider expects you to take. Review all of your home medications and newly ordered medications with your provider and/or pharmacist. Follow medication instructions as directed by your provider and/or pharmacist. Please keep your medication list with you and share with your provider. Update the information when medications are discontinued, doses are changed, or new medications (including over-the-counter products) are added; and carry medication information at all times in the event of emergency situations     Allergies:  No Known Allergies          Medications  Valid as of: March 24, 2017 - 10:34 AM    Generic Name Brand Name Tablet Size Instructions for use    AmLODIPine Besylate (Tab) NORVASC 5 MG Take 5 mg by mouth every day.        Cholecalciferol (Tab) cholecalciferol 1000  "UNIT Take 1,000 Units by mouth every day.        Finasteride (Tab) PROSCAR 5 MG Take 5 mg by mouth every day.        Furosemide (Tab) LASIX 40 MG Take 40 mg by mouth every day.        HydrALAZINE HCl (Tab) APRESOLINE 25 MG Take 25 mg by mouth 3 times a day.        Insulin NPH Isophane & Regular (Suspension) HUMULIN,NOVOLIN (70-30) 100 UNIT/ML Inject 18 Units as instructed 3 times a day before meals.        Insulin Syringe-Needle U-100 (Misc) INSULIN SYRINGE .5CC/31GX5/16\" 31G X 5/16\" 0.5 ML For insulin injections 3 times a day        Omeprazole (CAPSULE DELAYED RELEASE) PRILOSEC 20 MG Take 2 Caps by mouth every day.        Pioglitazone HCl (Tab) ACTOS 15 MG Take 1 Tab by mouth every day.        Potassium Chloride (Tab CR) KLOR-CON 10 MEQ Take 20 mEq by mouth every day.        Pravastatin Sodium (Tab) PRAVACHOL 20 MG Take 80 mg by mouth every evening.        Valsartan-Hydrochlorothiazide (Tab) DIOVAN--12.5 MG Take 0.5 Tabs by mouth every day.        Valsartan-Hydrochlorothiazide (Tab) DIOVAN--12.5 MG Take 1 Tab by mouth every day.        Warfarin Sodium (Tab) COUMADIN 6 MG Take 1 Tab by mouth every day at 6 PM.        .                 Medicines prescribed today were sent to:     [facility] [address] [place], [place] [address] [address] [address]    Phone: [phone] Fax: [phone]    Open 24 Hours?: No      Medication refill instructions:       If your prescription bottle indicates you have medication refills left, it is not necessary to call your provider’s office. Please contact your pharmacy and they will refill your medication.    If your prescription bottle indicates you do not have any refills left, you may request refills at any time through one of the following ways: The online [employer] system (except Urgent Care), by calling your provider’s office, or by asking your pharmacy to contact your provider’s office with a refill request. Medication refills " are processed only during regular business hours and may not be available until the next business day. Your provider may request additional information or to have a follow-up visit with you prior to refilling your medication.   *Please Note: Medication refills are assigned a new Rx number when refilled electronically. Your pharmacy may indicate that no refills were authorized even though a new prescription for the same medication is available at the pharmacy. Please request the medicine by name with the pharmacy before contacting your provider for a refill.        Your To Do List     Future Labs/Procedures Complete By Expires    HEMOGLOBIN A1C  As directed 3/24/2018         Movimento [employer] Access Code: [email or web address]  Expires: 4/2/2017  9:35 AM    Mov[employer] Group  A secure, online tool to manage your health information     [employer]’s Movimento Group® is a secure, online tool that connects you to your personalized health information from the privacy of your home -- day or night - making it very easy for you to manage your healthcare. Once the activation process is completed, you can even access your medical information using the Movimento Group mallorie, which is available for free in the Apple Mallorie store or Google Play store.     Mov[employer] Group provides the following levels of access (as shown below):   My Chart Features   [facility] Primary Care Doctor [facility]  Specialists [facility]  Urgent  Care Non-[facility]  Primary Care  Doctor   Email your healthcare team securely and privately 24/7 X X X    Manage appointments: schedule your next appointment; view details of past/upcoming appointments X      Request prescription refills. X      View recent personal medical records, including lab and immunizations X X X X   View health record, including health history, allergies, medications X X X X   Read reports about your outpatient visits, procedures, consult and ER notes X X X X   See your discharge summary, which is a recap of your hospital and/or ER visit that  includes your diagnosis, lab results, and care plan. X X       How to register for [employer]:  1. Go to  [email or web address].  2. Click on the Sign Up Now box, which takes you to the New Member Sign Up page. You will need to provide the following information:  a. Enter your [employer] Foundry Access Code exactly as it appears at the top of this page. (You will not need to use this code after you’ve completed the sign-up process. If you do not sign up before the expiration date, you must request a new code.)   b. Enter your date of birth.   c. Enter your home email address.   d. Click Submit, and follow the next screen’s instructions.  3. Create a Adsvarkt ID. This will be your Adsvarkt login ID and cannot be changed, so think of one that is secure and easy to remember.  4. Create a Adsvarkt password. You can change your password at any time.  5. Enter your Password Reset Question and Answer. This can be used at a later time if you forget your password.   6. Enter your e-mail address. This allows you to receive e-mail notifications when new information is available in Filter Foundry.  7. Click Sign Up. You can now view your health information.    For assistance activating your [employer] account, call [phone]

## 2017-03-24 NOTE — PROGRESS NOTES
Endocrinology Clinic Progress Note (Telemedicine visit)  Primary care physician: AIYANA Easley    CC: Routine follow-up for diabetes    HPI:  John Hernandez is a 82 y.o. old patient with history of type 2 diabetes.    Type 2 diabetes: He is currently on Novolin 70/30, he takes 18 units 3 times a day with meals. Reports compliance medications. He checks blood sugars 12 times a day. Most blood sugar readings are in the range of 150-200. Denies hypoglycemia.    Hypertension: Blood pressure is well controlled. He is on ARB.    Hyperlipidemia: He is currently on pravastatin. LDL 50.    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 at [facility]       Allergies:  Review of patient's allergies indicates no known allergies.    Social History:  Social History     Social History   • Marital Status: Single     Spouse Name: N/A   • Number of Children: N/A   • Years of Education: N/A     Occupational History   • Not on file.     Social History Main Topics   • Smoking status: Never Smoker    • Smokeless tobacco:  "Former User     Quit date: 05/04/2010   • Alcohol Use: No   • Drug Use: No   • Sexual Activity: Not on file     Other Topics Concern   • Not on file     Social History Narrative       Family History:  History reviewed. No pertinent family history.    Medications:    Current outpatient prescriptions:   •  valsartan-hydrochlorothiazide (DIOVAN HCT) 320-12.5 MG per tablet, Take 1 Tab by mouth every day., Disp: 30 Tab, Rfl: 3  •  pioglitazone (ACTOS) 15 MG Tab, Take 1 Tab by mouth every day., Disp: 30 Tab, Rfl: 4  •  omeprazole (PRILOSEC) 20 MG delayed-release capsule, Take 2 Caps by mouth every day., Disp: 60 Cap, Rfl: 3  •  warfarin (COUMADIN) 6 MG Tab, Take 1 Tab by mouth every day at 6 PM., Disp: 90 Tab, Rfl: 1  •  insulin 70/30 (NOVOLIN 70/30 RELION) (70-30) 100 UNIT/ML Suspension, Inject 18 Units as instructed 3 times a day before meals., Disp: 20 mL, Rfl: 6  •  Insulin Syringe-Needle U-100 (INSULIN SYRINGE .5CC/31GX5/16\") 31G X 5/16\" 0.5 ML Misc, For insulin injections 3 times a day, Disp: 300 Each, Rfl: 2  •  furosemide (LASIX) 40 MG Tab, Take 40 mg by mouth every day., Disp: , Rfl:   •  hydrALAZINE (APRESOLINE) 25 MG TABS, Take 25 mg by mouth 3 times a day., Disp: , Rfl:   •  valsartan-hydrochlorothiazide (DIOVAN-HCT) 320-12.5 MG per tablet, Take 0.5 Tabs by mouth every day., Disp: , Rfl:   •  potassium chloride CR (K-TABS) 10 MEQ tablet, Take 20 mEq by mouth every day., Disp: , Rfl:   •  amlodipine (NORVASC) 5 MG TABS, Take 5 mg by mouth every day., Disp: , Rfl:   •  pravastatin (PRAVACHOL) 20 MG TABS, Take 80 mg by mouth every evening., Disp: , Rfl:   •  finasteride (PROSCAR) 5 MG TABS, Take 5 mg by mouth every day., Disp: , Rfl:   •  vitamin D (CHOLECALCIFEROL) 1000 UNIT TABS, Take 1,000 Units by mouth every day., Disp: , Rfl:     Labs:  Results for TANIYA MONTEZ (MRN [id number]) as of 3/24/2017 10:05   Ref. Range 11/15/2016 07:06 2/11/2017 07:45   Sodium Latest Ref Range: 135-145 mmol/L  142   Potassium " "Latest Ref Range: 3.6-5.5 mmol/L  4.2   Chloride Latest Ref Range:  mmol/L  107   Co2 Latest Ref Range: 20-33 mmol/L  26   Anion Gap Latest Ref Range: 0.0-11.9   9.0   Glucose Latest Ref Range: 65-99 mg/dL  105 (H)   Bun Latest Ref Range: 8-22 mg/dL  29 (H)   Creatinine Latest Ref Range: 0.50-1.40 mg/dL  1.15   GFR If  Latest Ref Range: >60 mL/min/1.73 m 2  >60   GFR If Non  Latest Ref Range: >60 mL/min/1.73 m 2  >60   Calcium Latest Ref Range: 8.5-10.5 mg/dL  9.3   AST(SGOT) Latest Ref Range: 12-45 U/L  20   ALT(SGPT) Latest Ref Range: 2-50 U/L  15   Alkaline Phosphatase Latest Ref Range: 30-99 U/L  59   Total Bilirubin Latest Ref Range: 0.1-1.5 mg/dL  1.7 (H)   Albumin Latest Ref Range: 3.2-4.9 g/dL  3.9   Total Protein Latest Ref Range: 6.0-8.2 g/dL  6.8   Globulin Latest Ref Range: 1.9-3.5 g/dL  2.9   A-G Ratio Latest Units: g/dL  1.3   Glycohemoglobin Latest Ref Range: 0.0-5.6 % 7.4 (H)    Estim. Avg Glu Latest Units: mg/dL 166    Cholesterol,Tot Latest Ref Range: 100-199 mg/dL 93 (L)    Triglycerides Latest Ref Range: 0-149 mg/dL 48    HDL Latest Ref Range: >=40 mg/dL 33 (A)    LDL Latest Ref Range: <100 mg/dL 50    Micro Alb Creat Ratio Latest Ref Range: 0-30 mg/g 38 (H)    Creatinine, Urine Latest Units: mg/dL 88.50    Microalbumin, Urine Random Latest Units: mg/dL 3.4      Physical Examination:  Vital signs: /70 mmHg  Pulse 54  Resp 12  Ht 1.791 m (5' 10.5\")  Wt 103.42 kg (228 lb)  BMI 32.24 kg/m2  SpO2 94%  General: No apparent distress, cooperative  Eyes: No obvious exophthalmos  ENMT: Normal on external inspection of nose, lips  Neck: On visual inspection no obvious mass seen  Neuro: Alert and oriented  Skin: No rash on visible skin  Psych: Normal mood and affect    Assessment and Plan:    Type 2 diabetes mellitus with hyperglycemia, with long-term current use of insulin (CMS-[facility])  · We will repeat hemoglobin A1c  · Goal hemoglobin A1c less than " 8%  · Continue Novolin 70/3018 units 3 times a day with meals  · He is not having any issues with hypoglycemia  -     HEMOGLOBIN A1C; Future    Essential hypertension  · Blood pressure is well controlled  · Continue ARB, in addition to other antihypertensive agents    Mixed hyperlipidemia  · LDL 50  · Continue pravastatin      Return in about 3 months (around 6/24/2017).    Thank you for allowing me to participate in the care of this patient.    This visit was conducted utilizing secure and encrypted videoconferencing equipment with the assistance of a trained tele-presenter at the originating site.    Sky Malloy M.D.  03/24/2017    CC:   Ria Alford, A.P.R.N.    This note was created using voice recognition software (Dragon). The accuracy of the dictation is limited by the abilities of the software. I have reviewed the note prior to signing, however some errors in grammar and context are still possible. If you have any questions related to this note please do not hesitate to contact our office.

## 2017-03-25 ENCOUNTER — HOSPITAL ENCOUNTER (OUTPATIENT)
Dept: LAB | Facility: MEDICAL CENTER | Age: 82
End: 2017-03-25
Attending: INTERNAL MEDICINE
Payer: MEDICARE

## 2017-03-25 DIAGNOSIS — Z79.4 TYPE 2 DIABETES MELLITUS WITH HYPERGLYCEMIA, WITH LONG-TERM CURRENT USE OF INSULIN (HCC): ICD-10-CM

## 2017-03-25 DIAGNOSIS — E11.65 TYPE 2 DIABETES MELLITUS WITH HYPERGLYCEMIA, WITH LONG-TERM CURRENT USE OF INSULIN (HCC): ICD-10-CM

## 2017-03-25 LAB
EST. AVERAGE GLUCOSE BLD GHB EST-MCNC: 148 MG/DL
HBA1C MFR BLD: 6.8 % (ref 0–5.6)

## 2017-03-25 PROCEDURE — 83036 HEMOGLOBIN GLYCOSYLATED A1C: CPT | Mod: GA

## 2017-03-25 PROCEDURE — 36415 COLL VENOUS BLD VENIPUNCTURE: CPT | Mod: GA

## 2017-03-28 ENCOUNTER — HOSPITAL ENCOUNTER (OUTPATIENT)
Dept: LAB | Facility: MEDICAL CENTER | Age: 82
End: 2017-03-28
Attending: INTERNAL MEDICINE
Payer: MEDICARE

## 2017-03-28 LAB
T3 SERPL-MCNC: 79.2 NG/DL (ref 60–181)
T4 FREE SERPL-MCNC: 0.91 NG/DL (ref 0.53–1.43)
TSH SERPL DL<=0.005 MIU/L-ACNC: 2.46 UIU/ML (ref 0.3–3.7)

## 2017-03-28 PROCEDURE — 84480 ASSAY TRIIODOTHYRONINE (T3): CPT

## 2017-03-28 PROCEDURE — 36415 COLL VENOUS BLD VENIPUNCTURE: CPT

## 2017-03-28 PROCEDURE — 84443 ASSAY THYROID STIM HORMONE: CPT

## 2017-03-28 PROCEDURE — 84439 ASSAY OF FREE THYROXINE: CPT

## 2017-04-07 ENCOUNTER — ANTICOAGULATION VISIT (OUTPATIENT)
Dept: MEDICAL GROUP | Facility: PHYSICIAN GROUP | Age: 82
End: 2017-04-07
Payer: MEDICARE

## 2017-04-07 VITALS — HEART RATE: 50 BPM | SYSTOLIC BLOOD PRESSURE: 120 MMHG | DIASTOLIC BLOOD PRESSURE: 65 MMHG

## 2017-04-07 DIAGNOSIS — I48.91 ATRIAL FIBRILLATION, UNSPECIFIED TYPE (HCC): ICD-10-CM

## 2017-04-07 DIAGNOSIS — Z79.01 LONG TERM (CURRENT) USE OF ANTICOAGULANTS: ICD-10-CM

## 2017-04-07 LAB — INR PPP: 1.9 (ref 2–3.5)

## 2017-04-07 PROCEDURE — 85610 PROTHROMBIN TIME: CPT | Performed by: PHYSICIAN ASSISTANT

## 2017-04-07 NOTE — PROGRESS NOTES
Anticoagulation Summary as of 4/7/2017     INR goal 2.0-3.0   Selected INR 1.9! (4/7/2017)   Maintenance plan 6 mg (6 mg x 1) on Wed, Fri; 3 mg (6 mg x 0.5) all other days   Weekly total 27 mg   Plan last modified Keith Collado, PHARMD (3/17/2017)   Next INR check 4/28/2017   Target end date Indefinite    Indications   Atrial fibrillation (CMS-HCC) [I48.91]  Long term (current) use of anticoagulants [Z79.01] [Z79.01]         Anticoagulation Episode Summary     INR check location [facility]    Preferred lab     Send INR reminders to     Comments TTR is 45.05%, consider alternative      Anticoagulation Care Providers     Provider Role Specialty Phone number    Dontae Ng D.O. Referring Cardiology [phone]    [facility]   [phone]    AIYANA Easley  Family Medicine [phone]        Anticoagulation Patient Findings      John Hernandez seen in clinic today  INR  sub-therapeutic.    Denies signs/symptoms of bleeding and/or thrombosis.    Denies changes to diet or medications.   Follow up appointment in 3 week(s).    9mg today then continue weekly warfarin dose as noted    Keith Collado, PHARMD

## 2017-04-07 NOTE — MR AVS SNAPSHOT
John Hernandez   2017 8:30 AM   Anticoagulation Visit   MRN: [id number]    Department:  [facility]   Dept Phone:  [phone]    Description:  Male : 1934   Provider:  [facility]           Allergies as of 2017     No Known Allergies      You were diagnosed with     Long term (current) use of anticoagulants   [V58.61.ICD-9-CM]       Atrial fibrillation, unspecified type (CMS-HCC)   [8018553]         Vital Signs     Blood Pressure Pulse Smoking Status             120/65 mmHg 50 Never Smoker          Basic Information     Date Of Birth Sex Race Ethnicity Preferred Language    1934 Male White Non- English      Your appointments     2017  8:30 AM   Anti-Coag Routine with [facility]   [facility]    [address] [place] [id number] [phone]            Aug 04, 2017  8:20 AM   Established Patient with AIYANA Easley   [facility]    [address] [place] [id number] [phone]           You will be receiving a confirmation call a few days before your appointment from our automated call confirmation system.              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   5/3/2013 - Present    Hypertension I10   5/3/2013 - Present    Type 2 diabetes mellitus with complication (CMS-HCC) E11.8   5/3/2013 - Present    PVD (peripheral vascular disease) (CMS-HCC) I73.9   5/3/2013 - Present    Atrial fibrillation (CMS-HCC) I48.91   5/3/2013 - Present    Esophageal reflux K21.9   5/3/2013 - Present    Dyslipidemia E78.5   5/3/2013 - Present    Atrial fibrillation (CMS-HCC) I48.91   2015 - Present    Amputation of foot, left, traumatic, 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, T87.9   2016 - Present    Diabetic complication (CMS-HCC) E11.8   2017 - Present    Bilateral hearing loss H91.93    2/10/2017 - Present    Long term (current) use of anticoagulants [Z79.01] Z79.01   3/17/2017 - Present      Health Maintenance        Date Due Completion Dates    RETINAL SCREENING 7/27/1952 ---    IMM DTaP/Tdap/Td Vaccine (1 - Tdap) 7/27/1953 ---    IMM PNEUMOCOCCAL 65+ (ADULT) LOW/MEDIUM RISK SERIES (1 of 2 - PCV13) 7/27/1999 ---    A1C SCREENING 9/25/2017 3/25/2017, 11/15/2016, 10/12/2006, 7/6/2006, 4/5/2006, 1/3/2006    FASTING LIPID PROFILE 11/15/2017 11/15/2016, 10/12/2006, 7/6/2006, 4/5/2006    URINE ACR / MICROALBUMIN 11/15/2017 11/15/2016, 1/3/2006    DIABETES MONOFILAMENT / LE EXAM 2/10/2018 2/10/2017    SERUM CREATININE 2/11/2018 2/11/2017, 5/4/2013, 4/5/2006            Results     POCT Protime      Component    INR    1.9    Comment:     ic valid [id number] exp 02/2018                        Current Immunizations     Influenza TIV (IM) 11/1/2012    Influenza Vaccine Adult HD 8/19/2016    SHINGLES VACCINE 11/3/2016      Below and/or attached are the medications your provider expects you to take. Review all of your home medications and newly ordered medications with your provider and/or pharmacist. Follow medication instructions as directed by your provider and/or pharmacist. Please keep your medication list with you and share with your provider. Update the information when medications are discontinued, doses are changed, or new medications (including over-the-counter products) are added; and carry medication information at all times in the event of emergency situations     Allergies:  No Known Allergies          Medications  Valid as of: April 07, 2017 -  8:30 AM    Generic Name Brand Name Tablet Size Instructions for use    AmLODIPine Besylate (Tab) NORVASC 5 MG Take 5 mg by mouth every day.        Cholecalciferol (Tab) cholecalciferol 1000 UNIT Take 1,000 Units by mouth every day.        Finasteride (Tab) PROSCAR 5 MG Take 5 mg by mouth every day.        Furosemide (Tab) LASIX 40 MG Take 40 mg by  "mouth every day.        HydrALAZINE HCl (Tab) APRESOLINE 25 MG Take 25 mg by mouth 3 times a day.        Insulin NPH Isophane & Regular (Suspension) HUMULIN,NOVOLIN (70-30) 100 UNIT/ML Inject 18 Units as instructed 3 times a day before meals.        Insulin Syringe-Needle U-100 (Misc) INSULIN SYRINGE .5CC/31GX5/16\" 31G X 5/16\" 0.5 ML For insulin injections 3 times a day        Omeprazole (CAPSULE DELAYED RELEASE) PRILOSEC 20 MG Take 2 Caps by mouth every day.        Pioglitazone HCl (Tab) ACTOS 15 MG Take 1 Tab by mouth every day.        Potassium Chloride (Tab CR) KLOR-CON 10 MEQ Take 20 mEq by mouth every day.        Pravastatin Sodium (Tab) PRAVACHOL 20 MG Take 80 mg by mouth every evening.        Valsartan-Hydrochlorothiazide (Tab) DIOVAN--12.5 MG Take 0.5 Tabs by mouth every day.        Valsartan-Hydrochlorothiazide (Tab) DIOVAN--12.5 MG Take 1 Tab by mouth every day.        Warfarin Sodium (Tab) COUMADIN 6 MG Take 1 Tab by mouth every day at 6 PM.        .                 Medicines prescribed today were sent to:     [facility] [address] [place] [id number]    Phone: [phone] Fax: [phone]    Open 24 Hours?: No      Medication refill instructions:       If your prescription bottle indicates you have medication refills left, it is not necessary to call your provider’s office. Please contact your pharmacy and they will refill your medication.    If your prescription bottle indicates you do not have any refills left, you may request refills at any time through one of the following ways: The online [employer] system (except Urgent Care), by calling your provider’s office, or by asking your pharmacy to contact your provider’s office with a refill request. Medication refills are processed only during regular business hours and may not be available until the next business day. Your provider may request additional information or to " have a follow-up visit with you prior to refilling your medication.   *Please Note: Medication refills are assigned a new Rx number when refilled electronically. Your pharmacy may indicate that no refills were authorized even though a new prescription for the same medication is available at the pharmacy. Please request the medicine by name with the pharmacy before contacting your provider for a refill.        Warfarin Dosing Calendar   April 2017 Details    Sun Mon Tue Wed Thu Fri Sat           1                 2               3               4               5               6               7   1.9   9 mg   See details      8      3 mg           9      3 mg         10      3 mg         11      3 mg         12      6 mg         13      3 mg         14      6 mg         15      3 mg           16      3 mg         17      3 mg         18      3 mg         19      6 mg         20      3 mg         21      6 mg         22      3 mg           23      3 mg         24      3 mg         25      3 mg         26      6 mg         27      3 mg         28      6 mg         29                 30                      Date Details   04/07 This INR check   INR: 1.9   ic valid [id number] 160 exp 02/2018       Date of next INR:  4/28/2017         How to take your warfarin dose     To take:  3 mg Take 0.5 of a 6 mg tablet.    To take:  6 mg Take 1 of the 6 mg tablets.    To take:  9 mg Take 1.5 of the 6 mg tablets.              "[employer] Access Code: [email or web address]  Expires: 5/7/2017  8:30 AM    "[employer]  A secure, online tool to manage your health information     [employer]’s "[employer]® is a secure, online tool that connects you to your personalized health information from the privacy of your home -- day or night - making it very easy for you to manage your healthcare. Once the activation process is completed, you can even access your medical information using the "[employer] mallorie, which is available for free in the Apple Mallorie store or  Google Play store.     [employer] provides the following levels of access (as shown below):   My Chart Features   [facility] Doctor [facility]  Specialists [facility]  Urgent  Care Non-[facility]  Primary Care  Doctor   Email your healthcare team securely and privately 24/7 X X X    Manage appointments: schedule your next appointment; view details of past/upcoming appointments X      Request prescription refills. X      View recent personal medical records, including lab and immunizations X X X X   View health record, including health history, allergies, medications X X X X   Read reports about your outpatient visits, procedures, consult and ER notes X X X X   See your discharge summary, which is a recap of your hospital and/or ER visit that includes your diagnosis, lab results, and care plan. X X       How to register for [employer]:  1. Go to  [email or web address].  2. Click on the Sign Up Now box, which takes you to the New Member Sign Up page. You will need to provide the following information:  a. Enter your [employer] Access Code exactly as it appears at the top of this page. (You will not need to use this code after you’ve completed the sign-up process. If you do not sign up before the expiration date, you must request a new code.)   b. Enter your date of birth.   c. Enter your home email address.   d. Click Submit, and follow the next screen’s instructions.  3. Create a [employer] ID. This will be your [employer] login ID and cannot be changed, so think of one that is secure and easy to remember.  4. Create a [employer] password. You can change your password at any time.  5. Enter your Password Reset Question and Answer. This can be used at a later time if you forget your password.   6. Enter your e-mail address. This allows you to receive e-mail notifications when new information is available in [employer].  7. Click Sign Up. You can now view your health information.    For assistance activating your [employer] account, call  [phone]

## 2017-04-28 ENCOUNTER — ANTICOAGULATION VISIT (OUTPATIENT)
Dept: MEDICAL GROUP | Facility: PHYSICIAN GROUP | Age: 82
End: 2017-04-28
Payer: MEDICARE

## 2017-04-28 VITALS — SYSTOLIC BLOOD PRESSURE: 150 MMHG | HEART RATE: 53 BPM | DIASTOLIC BLOOD PRESSURE: 77 MMHG

## 2017-04-28 DIAGNOSIS — Z79.01 LONG TERM (CURRENT) USE OF ANTICOAGULANTS: ICD-10-CM

## 2017-04-28 DIAGNOSIS — I48.91 ATRIAL FIBRILLATION, UNSPECIFIED TYPE (HCC): ICD-10-CM

## 2017-04-28 LAB — INR PPP: 2 (ref 2–3.5)

## 2017-04-28 PROCEDURE — 85610 PROTHROMBIN TIME: CPT | Performed by: PHYSICIAN ASSISTANT

## 2017-04-28 NOTE — MR AVS SNAPSHOT
John Hernandez   2017 8:30 AM   Anticoagulation Visit   MRN: [id number]    Department:  [facility]   Dept Phone:  [phone]    Description:  Male : 1934   Provider:  KM ANTI-COAG           Allergies as of 2017     No Known Allergies      You were diagnosed with     Long term (current) use of anticoagulants   [V58.61.ICD-9-CM]       Atrial fibrillation, unspecified type (CMS-[facility])   [1341925]         Vital Signs     Blood Pressure Pulse Smoking Status             150/77 mmHg 53 Never Smoker          Basic Information     Date Of Birth Sex Race Ethnicity Preferred Language    1934 Male White Non- English      Your appointments     2017  8:30 AM   Anti-Coag Routine with Scheller ANTI-COAG   [facility]    [address] [place] [id number] [phone]            2017  8:45 AM   Anti-Coag Routine with Scheller ANTI-COAG   [facility]    [address] [place] [id number] [phone]            Aug 04, 2017  8:20 AM   Established Patient with AIYANA Easley   [facility]    [address] [place] [id number] [phone]           You will be receiving a confirmation call a few days before your appointment from our automated call confirmation system.              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   5/3/2013 - Present    Hypertension I10   5/3/2013 - Present    Type 2 diabetes mellitus with complication (CMS-HCC) E11.8   5/3/2013 - Present    PVD (peripheral vascular disease) (CMS-HCC) I73.9   5/3/2013 - Present    Atrial fibrillation (CMS-HCC) I48.91   5/3/2013 - Present    Esophageal reflux K21.9   5/3/2013 - Present    Dyslipidemia E78.5   5/3/2013 - Present    Atrial fibrillation (CMS-HCC) I48.91   2015 - Present    Amputation  of foot, left, traumatic, 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, T87.9   11/9/2016 - Present    Diabetic complication (CMS-HCC) E11.8   1/5/2017 - Present    Bilateral hearing loss H91.93   2/10/2017 - Present    Long term (current) use of anticoagulants [Z79.01] Z79.01   3/17/2017 - Present      Health Maintenance        Date Due Completion Dates    RETINAL SCREENING 7/27/1952 ---    IMM DTaP/Tdap/Td Vaccine (1 - Tdap) 7/27/1953 ---    IMM PNEUMOCOCCAL 65+ (ADULT) LOW/MEDIUM RISK SERIES (1 of 2 - PCV13) 7/27/1999 ---    A1C SCREENING 9/25/2017 3/25/2017, 11/15/2016, 10/12/2006, 7/6/2006, 4/5/2006, 1/3/2006    FASTING LIPID PROFILE 11/15/2017 11/15/2016, 10/12/2006, 7/6/2006, 4/5/2006    URINE ACR / MICROALBUMIN 11/15/2017 11/15/2016, 1/3/2006    DIABETES MONOFILAMENT / LE EXAM 2/10/2018 2/10/2017    SERUM CREATININE 2/11/2018 2/11/2017, 5/4/2013, 4/5/2006            Results     POCT Protime      Component    INR    2.0    Comment:     ic valid [id number] 160 01/2018                        Current Immunizations     Influenza TIV (IM) 11/1/2012    Influenza Vaccine Adult HD 8/19/2016    SHINGLES VACCINE 11/3/2016      Below and/or attached are the medications your provider expects you to take. Review all of your home medications and newly ordered medications with your provider and/or pharmacist. Follow medication instructions as directed by your provider and/or pharmacist. Please keep your medication list with you and share with your provider. Update the information when medications are discontinued, doses are changed, or new medications (including over-the-counter products) are added; and carry medication information at all times in the event of emergency situations     Allergies:  No Known Allergies          Medications  Valid as of: April 28, 2017 -  8:15 AM    Generic Name Brand Name Tablet Size Instructions for use    AmLODIPine Besylate (Tab) NORVASC 5 MG Take 5 mg by mouth every day.        Cholecalciferol (Tab)  "cholecalciferol 1000 UNIT Take 1,000 Units by mouth every day.        Finasteride (Tab) PROSCAR 5 MG Take 5 mg by mouth every day.        Furosemide (Tab) LASIX 40 MG Take 40 mg by mouth every day.        HydrALAZINE HCl (Tab) APRESOLINE 25 MG Take 25 mg by mouth 3 times a day.        Insulin NPH Isophane & Regular (Suspension) HUMULIN,NOVOLIN (70-30) 100 UNIT/ML Inject 18 Units as instructed 3 times a day before meals.        Insulin Syringe-Needle U-100 (Misc) INSULIN SYRINGE .5CC/31GX5/16\" 31G X 5/16\" 0.5 ML For insulin injections 3 times a day        Omeprazole (CAPSULE DELAYED RELEASE) PRILOSEC 20 MG Take 2 Caps by mouth every day.        Pioglitazone HCl (Tab) ACTOS 15 MG Take 1 Tab by mouth every day.        Potassium Chloride (Tab CR) KLOR-CON 10 MEQ Take 20 mEq by mouth every day.        Pravastatin Sodium (Tab) PRAVACHOL 20 MG Take 80 mg by mouth every evening.        Valsartan-Hydrochlorothiazide (Tab) DIOVAN--12.5 MG Take 0.5 Tabs by mouth every day.        Valsartan-Hydrochlorothiazide (Tab) DIOVAN--12.5 MG Take 1 Tab by mouth every day.        Warfarin Sodium (Tab) COUMADIN 6 MG Take 1 Tab by mouth every day at 6 PM.        .                 Medicines prescribed today were sent to:     [facility] [address] [place] [id number]    Phone: [phone] Fax: [phone]    Open 24 Hours?: No      Medication refill instructions:       If your prescription bottle indicates you have medication refills left, it is not necessary to call your provider’s office. Please contact your pharmacy and they will refill your medication.    If your prescription bottle indicates you do not have any refills left, you may request refills at any time through one of the following ways: The online [employer] system (except Urgent Care), by calling your provider’s office, or by asking your pharmacy to contact your provider’s office with a refill " request. Medication refills are processed only during regular business hours and may not be available until the next business day. Your provider may request additional information or to have a follow-up visit with you prior to refilling your medication.   *Please Note: Medication refills are assigned a new Rx number when refilled electronically. Your pharmacy may indicate that no refills were authorized even though a new prescription for the same medication is available at the pharmacy. Please request the medicine by name with the pharmacy before contacting your provider for a refill.        Warfarin Dosing Calendar   April 2017 Details    Sun Mon Tue Wed Thu Fri Sat           1                 2               3               4               5               6               7               8                 9               10               11               12               13               14               15                 16               17               18               19               20               21               22                 23               24               25               26               27               28   2.0   6 mg   See details      29      3 mg           30      3 mg                Date Details   04/28 This INR check   INR: 2.0   ic valid [id number] 160 01/2018               How to take your warfarin dose     To take:  3 mg Take 0.5 of a 6 mg tablet.    To take:  6 mg Take 1 of the 6 mg tablets.           Warfarin Dosing Calendar   May 2017 Details    Sun Mon Tue Wed Thu Fri Sat      1      3 mg         2      3 mg         3      6 mg         4      3 mg         5      6 mg         6      3 mg           7      3 mg         8      3 mg         9      3 mg         10      6 mg         11      3 mg         12      6 mg         13      3 mg           14      3 mg         15      3 mg         16      3 mg         17      6 mg         18      3 mg         19      6 mg         20      3 mg             21      3 mg         22      3 mg         23      3 mg         24      6 mg         25      3 mg         26      6 mg         27      3 mg           28      3 mg         29      3 mg         30      3 mg         31      6 mg             Date Details   No additional details            How to take your warfarin dose     To take:  3 mg Take 0.5 of a 6 mg tablet.    To take:  6 mg Take 1 of the 6 mg tablets.           Warfarin Dosing Calendar   June 2017 Details    Sun Mon Tue Wed Thu Fri Sat         1      3 mg         2      6 mg         3                 4               5               6               7               8               9               10                 11               12               13               14               15               16               17                 18               19               20               21               22               23               24                 25               26               27               28               29               30                 Date Details   No additional details    Date of next INR:  6/2/2017         How to take your warfarin dose     To take:  3 mg Take 0.5 of a 6 mg tablet.    To take:  6 mg Take 1 of the 6 mg tablets.              Bizible Access Code: [email or web address]  Expires: 5/7/2017  8:30 AM    Bizible  A secure, online tool to manage your health information     [employer] Bizible® is a secure, online tool that connects you to your personalized health information from the privacy of your home -- day or night - making it very easy for you to manage your healthcare. Once the activation process is completed, you can even access your medical information using the Bizible mallorie, which is available for free in the Apple Mallorie store or Google Play store.     Bizible provides the following levels of access (as shown below):   My Chart Features   [facility] Doctor [facility]  Specialists [facility]  Urgent  Care Non-[facility]  Primary  Care  Doctor   Email your healthcare team securely and privately 24/7 X X X    Manage appointments: schedule your next appointment; view details of past/upcoming appointments X      Request prescription refills. X      View recent personal medical records, including lab and immunizations X X X X   View health record, including health history, allergies, medications X X X X   Read reports about your outpatient visits, procedures, consult and ER notes X X X X   See your discharge summary, which is a recap of your hospital and/or ER visit that includes your diagnosis, lab results, and care plan. X X       How to register for Henable:  1. Go to  [email or web address].  2. Click on the Sign Up Now box, which takes you to the New Member Sign Up page. You will need to provide the following information:  a. Enter your Henable Access Code exactly as it appears at the top of this page. (You will not need to use this code after you’ve completed the sign-up process. If you do not sign up before the expiration date, you must request a new code.)   b. Enter your date of birth.   c. Enter your home email address.   d. Click Submit, and follow the next screen’s instructions.  3. Create a Henable ID. This will be your Henable login ID and cannot be changed, so think of one that is secure and easy to remember.  4. Create a Henable password. You can change your password at any time.  5. Enter your Password Reset Question and Answer. This can be used at a later time if you forget your password.   6. Enter your e-mail address. This allows you to receive e-mail notifications when new information is available in Henable.  7. Click Sign Up. You can now view your health information.    For assistance activating your Henable account, call [phone]

## 2017-05-31 ENCOUNTER — OFFICE VISIT (OUTPATIENT)
Dept: URGENT CARE | Facility: PHYSICIAN GROUP | Age: 82
End: 2017-05-31
Payer: MEDICARE

## 2017-05-31 ENCOUNTER — HOSPITAL ENCOUNTER (OUTPATIENT)
Dept: LAB | Facility: MEDICAL CENTER | Age: 82
End: 2017-05-31
Attending: EMERGENCY MEDICINE
Payer: MEDICARE

## 2017-05-31 VITALS
HEART RATE: 73 BPM | DIASTOLIC BLOOD PRESSURE: 70 MMHG | TEMPERATURE: 97.9 F | OXYGEN SATURATION: 94 % | BODY MASS INDEX: 32.38 KG/M2 | SYSTOLIC BLOOD PRESSURE: 150 MMHG | WEIGHT: 229 LBS | RESPIRATION RATE: 24 BRPM

## 2017-05-31 DIAGNOSIS — Z79.01 ANTICOAGULANT LONG-TERM USE: ICD-10-CM

## 2017-05-31 DIAGNOSIS — R04.0 POSTERIOR EPISTAXIS: ICD-10-CM

## 2017-05-31 LAB
BASOPHILS # BLD AUTO: 1.4 % (ref 0–1.8)
BASOPHILS # BLD: 0.09 K/UL (ref 0–0.12)
EOSINOPHIL # BLD AUTO: 0.16 K/UL (ref 0–0.51)
EOSINOPHIL NFR BLD: 2.4 % (ref 0–6.9)
ERYTHROCYTE [DISTWIDTH] IN BLOOD BY AUTOMATED COUNT: 54.5 FL (ref 35.9–50)
HCT VFR BLD AUTO: 40.2 % (ref 42–52)
HGB BLD-MCNC: 12.8 G/DL (ref 14–18)
IMM GRANULOCYTES # BLD AUTO: 0.02 K/UL (ref 0–0.11)
IMM GRANULOCYTES NFR BLD AUTO: 0.3 % (ref 0–0.9)
INR PPP: 2.87 (ref 0.87–1.13)
LYMPHOCYTES # BLD AUTO: 0.89 K/UL (ref 1–4.8)
LYMPHOCYTES NFR BLD: 13.4 % (ref 22–41)
MCH RBC QN AUTO: 31.4 PG (ref 27–33)
MCHC RBC AUTO-ENTMCNC: 31.8 G/DL (ref 33.7–35.3)
MCV RBC AUTO: 98.5 FL (ref 81.4–97.8)
MONOCYTES # BLD AUTO: 0.82 K/UL (ref 0–0.85)
MONOCYTES NFR BLD AUTO: 12.3 % (ref 0–13.4)
NEUTROPHILS # BLD AUTO: 4.68 K/UL (ref 1.82–7.42)
NEUTROPHILS NFR BLD: 70.2 % (ref 44–72)
NRBC # BLD AUTO: 0 K/UL
NRBC BLD AUTO-RTO: 0 /100 WBC
PLATELET # BLD AUTO: 214 K/UL (ref 164–446)
PMV BLD AUTO: 10.7 FL (ref 9–12.9)
PROTHROMBIN TIME: 31 SEC (ref 12–14.6)
RBC # BLD AUTO: 4.08 M/UL (ref 4.7–6.1)
WBC # BLD AUTO: 6.7 K/UL (ref 4.8–10.8)

## 2017-05-31 PROCEDURE — G8598 ASA/ANTIPLAT THER USED: HCPCS | Performed by: EMERGENCY MEDICINE

## 2017-05-31 PROCEDURE — 36415 COLL VENOUS BLD VENIPUNCTURE: CPT

## 2017-05-31 PROCEDURE — 4040F PNEUMOC VAC/ADMIN/RCVD: CPT | Mod: 8P | Performed by: EMERGENCY MEDICINE

## 2017-05-31 PROCEDURE — 99214 OFFICE O/P EST MOD 30 MIN: CPT | Performed by: EMERGENCY MEDICINE

## 2017-05-31 PROCEDURE — G8432 DEP SCR NOT DOC, RNG: HCPCS | Performed by: EMERGENCY MEDICINE

## 2017-05-31 PROCEDURE — 85025 COMPLETE CBC W/AUTO DIFF WBC: CPT

## 2017-05-31 PROCEDURE — 1101F PT FALLS ASSESS-DOCD LE1/YR: CPT | Performed by: EMERGENCY MEDICINE

## 2017-05-31 PROCEDURE — G8419 CALC BMI OUT NRM PARAM NOF/U: HCPCS | Performed by: EMERGENCY MEDICINE

## 2017-05-31 PROCEDURE — 85610 PROTHROMBIN TIME: CPT

## 2017-05-31 PROCEDURE — 1036F TOBACCO NON-USER: CPT | Performed by: EMERGENCY MEDICINE

## 2017-05-31 RX ORDER — PRAVASTATIN SODIUM 20 MG
40 TABLET ORAL DAILY
Qty: 180 TAB | Refills: 2 | Status: SHIPPED | OUTPATIENT
Start: 2017-05-31 | End: 2017-09-11 | Stop reason: SDUPTHER

## 2017-05-31 ASSESSMENT — ENCOUNTER SYMPTOMS
FEVER: 0
DIZZINESS: 0
HEMOPTYSIS: 0
BLOOD IN STOOL: 0

## 2017-05-31 NOTE — PROGRESS NOTES
"Subjective:      John Hernandez is a 82 y.o. male who presents with Epistaxis            Epistaxis   The bleeding has been from the left nare. This is a new problem. The current episode started today. The problem occurs constantly. The problem has been resolved. The bleeding is associated with anticoagulants. He has tried nasal tampon for the symptoms. The treatment provided significant relief. There is no history of allergies, colds, frequent nosebleeds or sinus problems.   No trauma; occasional sneezing. Had last INR one month ago 2.0. No change in warfarin dosing.    Review of Systems   Constitutional: Negative for fever and malaise/fatigue.   HENT: Positive for nosebleeds. Negative for congestion.    Respiratory: Negative for hemoptysis.    Gastrointestinal: Negative for blood in stool.   Genitourinary: Negative for hematuria.   Skin: Negative for rash.   Neurological: Negative for dizziness.     PMH:  has a past medical history of Hypertension; Arrhythmia; Dental disorder; Urinary bladder disorder; Glaucoma; Heart murmur; Peripheral vascular disease (CMS-HCC); and Wound of left leg. He also has no past medical history of ASTHMA.  MEDS:   Current outpatient prescriptions:   •  valsartan-hydrochlorothiazide (DIOVAN HCT) 320-12.5 MG per tablet, Take 1 Tab by mouth every day., Disp: 30 Tab, Rfl: 3  •  pioglitazone (ACTOS) 15 MG Tab, Take 1 Tab by mouth every day., Disp: 30 Tab, Rfl: 4  •  omeprazole (PRILOSEC) 20 MG delayed-release capsule, Take 2 Caps by mouth every day., Disp: 60 Cap, Rfl: 3  •  warfarin (COUMADIN) 6 MG Tab, Take 1 Tab by mouth every day at 6 PM., Disp: 90 Tab, Rfl: 1  •  insulin 70/30 (NOVOLIN 70/30 RELION) (70-30) 100 UNIT/ML Suspension, Inject 18 Units as instructed 3 times a day before meals., Disp: 20 mL, Rfl: 6  •  Insulin Syringe-Needle U-100 (INSULIN SYRINGE .5CC/31GX5/16\") 31G X 5/16\" 0.5 ML Misc, For insulin injections 3 times a day, Disp: 300 Each, Rfl: 2  •  furosemide (LASIX) 40 MG " Tab, Take 40 mg by mouth every day., Disp: , Rfl:   •  hydrALAZINE (APRESOLINE) 25 MG TABS, Take 25 mg by mouth 3 times a day., Disp: , Rfl:   •  valsartan-hydrochlorothiazide (DIOVAN-HCT) 320-12.5 MG per tablet, Take 0.5 Tabs by mouth every day., Disp: , Rfl:   •  potassium chloride CR (K-TABS) 10 MEQ tablet, Take 20 mEq by mouth every day., Disp: , Rfl:   •  amlodipine (NORVASC) 5 MG TABS, Take 5 mg by mouth every day., Disp: , Rfl:   •  pravastatin (PRAVACHOL) 20 MG TABS, Take 80 mg by mouth every evening., Disp: , Rfl:   •  finasteride (PROSCAR) 5 MG TABS, Take 5 mg by mouth every day., Disp: , Rfl:   •  vitamin D (CHOLECALCIFEROL) 1000 UNIT TABS, Take 1,000 Units by mouth every day., Disp: , Rfl:   ALLERGIES: No Known Allergies  SURGHX:   Past Surgical History   Procedure Laterality Date   • Other neurological surg       BKA LEFT LEG   • Brachial embolectomy  5/3/2013     Performed by Ananth Cook M.D. at SUR[facility]     SOCHX:  reports that he has never smoked. He quit smokeless tobacco use about 7 years ago. He reports that he does not drink alcohol or use illicit drugs.  FH: family history is not on file.     Objective:     /70 mmHg  Pulse 73  Temp(Src) 36.6 °C (97.9 °F)  Resp 24  Wt 103.874 kg (229 lb)  SpO2 94%     Physical Exam   Constitutional: He is oriented to person, place, and time. He appears well-developed and well-nourished. He is cooperative. He does not have a sickly appearance. He does not appear ill.   HENT:   Head: Normocephalic and atraumatic.   Nose: No sinus tenderness, septal deviation or nasal septal hematoma. No epistaxis.   Mouth/Throat: Oropharynx is clear and moist and mucous membranes are normal.   Eyes: No scleral icterus.   Neck: Phonation normal. Neck supple.   Pulmonary/Chest: Effort normal.   Lymphadenopathy:     He has no cervical adenopathy.   Neurological: He is alert and oriented to person, place, and time.   Skin: Skin is warm, dry and intact.    Psychiatric: He has a normal mood and affect.          Homemade paper tissue nasal tampon removed; no active bleeding, no anterior septal bleeding site noted. Instilled 2 sprays Danial-Synephrine.     Assessment/Plan:     1. Posterior epistaxis  - CBC with diff; Future  Advised need for emergency department evaluation if any significant recurrent bleeding.    2. Anticoagulant long-term use  - PROTHROMBIN TIME; Future

## 2017-05-31 NOTE — PATIENT INSTRUCTIONS
Use nasal saline drops or spray as needed. Test results will be available in the next day; the clinic will contact you with results. Go to the nearest hospital Emergency Department, or call 911, if any worsening condition.      Nosebleed  Nosebleeds are common. A nosebleed can be caused by many things, including:  · Getting hit hard in the nose.  · Infections.  · Dryness in your nose.  · A dry climate.  · Medicines.  · Picking your nose.  · Your home heating and cooling systems.  HOME CARE   · Try controlling your nosebleed by pinching your nostrils gently. Do this for at least 10 minutes.  · Avoid blowing or sniffing your nose for a number of hours after having a nosebleed.  · Do not put gauze inside of your nose yourself. If your nose was packed by your doctor, try to keep the pack inside of your nose until your doctor removes it.  ¨ If a gauze pack was used and it starts to fall out, gently replace it or cut off the end of it.  ¨ If a balloon catheter was used to pack your nose, do not cut or remove it unless told by your doctor.  · Avoid lying down while you are having a nosebleed. Sit up and lean forward.  · Use a nasal spray decongestant to help with a nosebleed as told by your doctor.  · Do not use petroleum jelly or mineral oil in your nose. These can drip into your lungs.  · Keep your house humid by using:  ¨ Less air conditioning.  ¨ A humidifier.  · Aspirin and blood thinners make bleeding more likely. If you are prescribed these medicines and you have nosebleeds, ask your doctor if you should stop taking the medicines or adjust the dose. Do not stop medicines unless told by your doctor.  · Resume your normal activities as you are able. Avoid straining, lifting, or bending at your waist for several days.  · If your nosebleed was caused by dryness in your nose, use over-the-counter saline nasal spray or gel. If you must use a lubricant:  ¨ Choose one that is water-soluble.  ¨ Use it only as needed.  ¨ Do  not use it within several hours of lying down.  · Keep all follow-up visits as told by your doctor. This is important.  GET HELP IF:  · You have a fever.  · You get frequent nosebleeds.  · You are getting nosebleeds more often.  GET HELP RIGHT AWAY IF:  · Your nosebleed lasts longer than 20 minutes.  · Your nosebleed occurs after an injury to your face, and your nose looks crooked or broken.  · You have unusual bleeding from other parts of your body.  · You have unusual bruising on other parts of your body.  · You feel light-headed or dizzy.  · You become sweaty.  · You throw up (vomit) blood.  · You have a nosebleed after a head injury.     This information is not intended to replace advice given to you by your health care provider. Make sure you discuss any questions you have with your health care provider.     Document Released: 09/26/2009 Document Revised: 01/08/2016 Document Reviewed: 08/03/2015  [employer] Interactive Patient Education ©2016 [employer]

## 2017-05-31 NOTE — MR AVS SNAPSHOT
John MOISE Mary   2017 8:40 AM   Office Visit   MRN: [id number]    Department:  [facility]   Dept Phone:  [phone]    Description:  Male : 1934   Provider:  Corby Carrillo M.D.           Reason for Visit     Epistaxis 5:30 this morning      Allergies as of 2017     No Known Allergies      You were diagnosed with     Posterior epistaxis   [1018815]       Anticoagulant long-term use   [878130]         Vital Signs     Blood Pressure Pulse Temperature Respirations Weight Oxygen Saturation    150/70 mmHg 73 36.6 °C (97.9 °F) 24 103.874 kg (229 lb) 94%    Smoking Status                   Never Smoker            Basic Information     Date Of Birth Sex Race Ethnicity Preferred Language    1934 Male White Non- English      Your appointments     2017  8:45 AM   Anti-Coag Routine with [place] ANTI-COAG   [facility] ([facility]    [address] [place] [id number] [phone]            Aug 04, 2017  8:20 AM   Established Patient with AIYANA Easley   [facility]    [address] [place] [id number] [phone]           You will be receiving a confirmation call a few days before your appointment from our automated call confirmation system.            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   5/3/2013 - Present    Hypertension I10   5/3/2013 - Present    Type 2 diabetes mellitus with complication (CMS-HCC) E11.8   5/3/2013 - Present    PVD (peripheral vascular disease) (CMS-HCC) I73.9   5/3/2013 - Present    Atrial fibrillation (CMS-HCC)  I48.91   5/3/2013 - Present    Esophageal reflux K21.9   5/3/2013 - Present    Dyslipidemia E78.5   5/3/2013 - Present    Atrial fibrillation (CMS-HCC) I48.91   9/25/2015 - Present    Amputation of foot, left, traumatic, 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, 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   11/9/2016 - Present    Diabetic complication (CMS-HCC) E11.8   1/5/2017 - Present    Bilateral hearing loss H91.93   2/10/2017 - Present    Long term (current) use of anticoagulants [Z79.01] Z79.01   3/17/2017 - Present      Health Maintenance        Date Due Completion Dates    RETINAL SCREENING 7/27/1952 ---    IMM DTaP/Tdap/Td Vaccine (1 - Tdap) 7/27/1953 ---    IMM PNEUMOCOCCAL 65+ (ADULT) LOW/MEDIUM RISK SERIES (1 of 2 - PCV13) 7/27/1999 ---    A1C SCREENING 9/25/2017 3/25/2017, 11/15/2016, 10/12/2006, 7/6/2006, 4/5/2006, 1/3/2006    FASTING LIPID PROFILE 11/15/2017 11/15/2016, 10/12/2006, 7/6/2006, 4/5/2006    URINE ACR / MICROALBUMIN 11/15/2017 11/15/2016, 1/3/2006    DIABETES MONOFILAMENT / LE EXAM 2/10/2018 2/10/2017    SERUM CREATININE 2/11/2018 2/11/2017, 5/4/2013, 4/5/2006            Current Immunizations     Influenza TIV (IM) 11/1/2012    Influenza Vaccine Adult HD 8/19/2016    SHINGLES VACCINE 11/3/2016      Below and/or attached are the medications your provider expects you to take. Review all of your home medications and newly ordered medications with your provider and/or pharmacist. Follow medication instructions as directed by your provider and/or pharmacist. Please keep your medication list with you and share with your provider. Update the information when medications are discontinued, doses are changed, or new medications (including over-the-counter products) are added; and carry medication information at all times in the event of emergency situations     Allergies:  No Known Allergies          Medications  Valid as of: May 31, 2017 - 10:05 AM    Generic Name Brand Name Tablet Size Instructions for use    AmLODIPine Besylate (Tab) NORVASC  "5 MG Take 5 mg by mouth every day.        Cholecalciferol (Tab) cholecalciferol 1000 UNIT Take 1,000 Units by mouth every day.        Finasteride (Tab) PROSCAR 5 MG Take 5 mg by mouth every day.        Furosemide (Tab) LASIX 40 MG Take 40 mg by mouth every day.        HydrALAZINE HCl (Tab) APRESOLINE 25 MG Take 25 mg by mouth 3 times a day.        Insulin NPH Isophane & Regular (Suspension) HUMULIN,NOVOLIN (70-30) 100 UNIT/ML Inject 18 Units as instructed 3 times a day before meals.        Insulin Syringe-Needle U-100 (Misc) INSULIN SYRINGE .5CC/31GX5/16\" 31G X 5/16\" 0.5 ML For insulin injections 3 times a day        Omeprazole (CAPSULE DELAYED RELEASE) PRILOSEC 20 MG Take 2 Caps by mouth every day.        Pioglitazone HCl (Tab) ACTOS 15 MG Take 1 Tab by mouth every day.        Potassium Chloride (Tab CR) KLOR-CON 10 MEQ Take 20 mEq by mouth every day.        Pravastatin Sodium (Tab) PRAVACHOL 20 MG Take 80 mg by mouth every evening.        Valsartan-Hydrochlorothiazide (Tab) DIOVAN--12.5 MG Take 0.5 Tabs by mouth every day.        Valsartan-Hydrochlorothiazide (Tab) DIOVAN--12.5 MG Take 1 Tab by mouth every day.        Warfarin Sodium (Tab) COUMADIN 6 MG Take 1 Tab by mouth every day at 6 PM.        .                 Medicines prescribed today were sent to:     [facility] [address] [place] [id number]    Phone: [phone] Fax: [phone]    Open 24 Hours?: No      Medication refill instructions:       If your prescription bottle indicates you have medication refills left, it is not necessary to call your provider’s office. Please contact your pharmacy and they will refill your medication.    If your prescription bottle indicates you do not have any refills left, you may request refills at any time through one of the following ways: The online [employer] system (except Urgent Care), by calling your provider’s office, or by asking your " pharmacy to contact your provider’s office with a refill request. Medication refills are processed only during regular business hours and may not be available until the next business day. Your provider may request additional information or to have a follow-up visit with you prior to refilling your medication.   *Please Note: Medication refills are assigned a new Rx number when refilled electronically. Your pharmacy may indicate that no refills were authorized even though a new prescription for the same medication is available at the pharmacy. Please request the medicine by name with the pharmacy before contacting your provider for a refill.        Your To Do List     Future Labs/Procedures Complete By Expires    CBC WITH DIFFERENTIAL  As directed 5/31/2018    PROTHROMBIN TIME  As directed 5/31/2018      Instructions    Use nasal saline drops or spray as needed. Test results will be available in the next day; the clinic will contact you with results. Go to the nearest hospital Emergency Department, or call 911, if any worsening condition.      Nosebleed  Nosebleeds are common. A nosebleed can be caused by many things, including:  · Getting hit hard in the nose.  · Infections.  · Dryness in your nose.  · A dry climate.  · Medicines.  · Picking your nose.  · Your home heating and cooling systems.  HOME CARE   · Try controlling your nosebleed by pinching your nostrils gently. Do this for at least 10 minutes.  · Avoid blowing or sniffing your nose for a number of hours after having a nosebleed.  · Do not put gauze inside of your nose yourself. If your nose was packed by your doctor, try to keep the pack inside of your nose until your doctor removes it.  ¨ If a gauze pack was used and it starts to fall out, gently replace it or cut off the end of it.  ¨ If a balloon catheter was used to pack your nose, do not cut or remove it unless told by your doctor.  · Avoid lying down while you are having a nosebleed. Sit up and lean  forward.  · Use a nasal spray decongestant to help with a nosebleed as told by your doctor.  · Do not use petroleum jelly or mineral oil in your nose. These can drip into your lungs.  · Keep your house humid by using:  ¨ Less air conditioning.  ¨ A humidifier.  · Aspirin and blood thinners make bleeding more likely. If you are prescribed these medicines and you have nosebleeds, ask your doctor if you should stop taking the medicines or adjust the dose. Do not stop medicines unless told by your doctor.  · Resume your normal activities as you are able. Avoid straining, lifting, or bending at your waist for several days.  · If your nosebleed was caused by dryness in your nose, use over-the-counter saline nasal spray or gel. If you must use a lubricant:  ¨ Choose one that is water-soluble.  ¨ Use it only as needed.  ¨ Do not use it within several hours of lying down.  · Keep all follow-up visits as told by your doctor. This is important.  GET HELP IF:  · You have a fever.  · You get frequent nosebleeds.  · You are getting nosebleeds more often.  GET HELP RIGHT AWAY IF:  · Your nosebleed lasts longer than 20 minutes.  · Your nosebleed occurs after an injury to your face, and your nose looks crooked or broken.  · You have unusual bleeding from other parts of your body.  · You have unusual bruising on other parts of your body.  · You feel light-headed or dizzy.  · You become sweaty.  · You throw up (vomit) blood.  · You have a nosebleed after a head injury.     This information is not intended to replace advice given to you by your health care provider. Make sure you discuss any questions you have with your health care provider.     Document Released: 09/26/2009 Document Revised: 01/08/2016 Document Reviewed: 08/03/2015  [employer] Interactive Patient Education ©2016 [employer]              [employer] Access Code: [email or web address]  Expires: 6/30/2017 10:05 AM    [employer]  A secure, online tool to manage your health  information     [employer]’s [employer]® is a secure, online tool that connects you to your personalized health information from the privacy of your home -- day or night - making it very easy for you to manage your healthcare. Once the activation process is completed, you can even access your medical information using the [employer] mallorie, which is available for free in the Apple Mallorie store or Google Play store.     [employer] provides the following levels of access (as shown below):   My Chart Features   [facility] Doctor [facility]  Specialists [facility]  Urgent  Care Non-[facility]  Primary Care  Doctor   Email your healthcare team securely and privately 24/7 X X X    Manage appointments: schedule your next appointment; view details of past/upcoming appointments X      Request prescription refills. X      View recent personal medical records, including lab and immunizations X X X X   View health record, including health history, allergies, medications X X X X   Read reports about your outpatient visits, procedures, consult and ER notes X X X X   See your discharge summary, which is a recap of your hospital and/or ER visit that includes your diagnosis, lab results, and care plan. X X       How to register for [employer]:  1. Go to  [email or web address].  2. Click on the Sign Up Now box, which takes you to the New Member Sign Up page. You will need to provide the following information:  a. Enter your [employer] Access Code exactly as it appears at the top of this page. (You will not need to use this code after you’ve completed the sign-up process. If you do not sign up before the expiration date, you must request a new code.)   b. Enter your date of birth.   c. Enter your home email address.   d. Click Submit, and follow the next screen’s instructions.  3. Create a [employer] ID. This will be your [employer] login ID and cannot be changed, so think of one that is secure and easy to remember.  4. Create a [employer] password. You can  change your password at any time.  5. Enter your Password Reset Question and Answer. This can be used at a later time if you forget your password.   6. Enter your e-mail address. This allows you to receive e-mail notifications when new information is available in Look[employer].  7. Click Sign Up. You can now view your health information.    For assistance activating your [employer] account, call [phone]

## 2017-06-01 ENCOUNTER — TELEPHONE (OUTPATIENT)
Dept: URGENT CARE | Facility: PHYSICIAN GROUP | Age: 82
End: 2017-06-01

## 2017-06-01 NOTE — TELEPHONE ENCOUNTER
Please notify patient of lab test results; INR mildly elevated for therapeutic range, but no immediate changes to Coumadin dose needed.  Advise patient to contact PCP to communicate test results today.

## 2017-06-02 ENCOUNTER — ANTICOAGULATION MONITORING (OUTPATIENT)
Dept: MEDICAL GROUP | Facility: PHYSICIAN GROUP | Age: 82
End: 2017-06-02

## 2017-06-02 DIAGNOSIS — I48.91 ATRIAL FIBRILLATION, UNSPECIFIED TYPE (HCC): ICD-10-CM

## 2017-06-02 DIAGNOSIS — Z79.01 LONG TERM (CURRENT) USE OF ANTICOAGULANTS: ICD-10-CM

## 2017-06-02 NOTE — PROGRESS NOTES
"Anticoagulation Summary as of 6/2/2017     INR goal 2.0-3.0   Selected INR 2.87 (5/31/2017)   Maintenance plan 6 mg (6 mg x 1) on Wed, Fri; 3 mg (6 mg x 0.5) all other days   Weekly total 27 mg   Plan last modified Keith Collado, PHARMD (3/17/2017)   Next INR check 7/14/2017   Target end date Indefinite    Indications   Atrial fibrillation (CMS-HCC) [I48.91]  Long term (current) use of anticoagulants [Z79.01] [Z79.01]         Anticoagulation Episode Summary     INR check location [facility]    Preferred lab     Send INR reminders to     Comments TTR is 45.05%, consider alternative      Anticoagulation Care Providers     Provider Role Specialty Phone number    Dontae Ng D.O. Referring Cardiology [phone]    [facility]   [phone]    AIYANA Easley  Family Medicine [phone]        Anticoagulation Patient Findings      Current Outpatient Prescriptions on File Prior to Visit   Medication Sig Dispense Refill   • pravastatin (PRAVACHOL) 20 MG Tab Take 2 Tabs by mouth every day. 180 Tab 2   • valsartan-hydrochlorothiazide (DIOVAN HCT) 320-12.5 MG per tablet Take 1 Tab by mouth every day. 30 Tab 3   • pioglitazone (ACTOS) 15 MG Tab Take 1 Tab by mouth every day. 30 Tab 4   • omeprazole (PRILOSEC) 20 MG delayed-release capsule Take 2 Caps by mouth every day. 60 Cap 3   • warfarin (COUMADIN) 6 MG Tab Take 1 Tab by mouth every day at 6 PM. 90 Tab 1   • insulin 70/30 (NOVOLIN 70/30 RELION) (70-30) 100 UNIT/ML Suspension Inject 18 Units as instructed 3 times a day before meals. 20 mL 6   • Insulin Syringe-Needle U-100 (INSULIN SYRINGE .5CC/31GX5/16\") 31G X 5/16\" 0.5 ML Misc For insulin injections 3 times a day 300 Each 2   • furosemide (LASIX) 40 MG Tab Take 40 mg by mouth every day.     • hydrALAZINE (APRESOLINE) 25 MG TABS Take 25 mg by mouth 3 times a day.     • valsartan-hydrochlorothiazide (DIOVAN-HCT) 320-12.5 MG per tablet Take 0.5 Tabs by mouth every day.     • " potassium chloride CR (K-TABS) 10 MEQ tablet Take 20 mEq by mouth every day.     • amlodipine (NORVASC) 5 MG TABS Take 5 mg by mouth every day.     • finasteride (PROSCAR) 5 MG TABS Take 5 mg by mouth every day.     • vitamin D (CHOLECALCIFEROL) 1000 UNIT TABS Take 1,000 Units by mouth every day.       No current facility-administered medications on file prior to visit.       Lab Results   Component Value Date/Time    SODIUM 142 02/11/2017 07:45 AM    POTASSIUM 4.2 02/11/2017 07:45 AM    CHLORIDE 107 02/11/2017 07:45 AM    CO2 26 02/11/2017 07:45 AM    GLUCOSE 105* 02/11/2017 07:45 AM    BUN 29* 02/11/2017 07:45 AM    CREATININE 1.15 02/11/2017 07:45 AM          John SUMA Mary seen in clinic today, but no charge today because he got a INR two days ago, so will use a phone visit.   INR  therapeutic.    Denies signs/symptoms of bleeding and/or thrombosis.    Denies changes to diet or medications.   Follow up appointment in 6 week(s).      Continue weekly warfarin dose as noted       Keith Collado, PHARMD

## 2017-06-26 ENCOUNTER — PATIENT OUTREACH (OUTPATIENT)
Dept: HEALTH INFORMATION MANAGEMENT | Facility: OTHER | Age: 82
End: 2017-06-26

## 2017-06-27 ENCOUNTER — APPOINTMENT (OUTPATIENT)
Dept: ENDOCRINOLOGY | Facility: MEDICAL CENTER | Age: 82
End: 2017-06-27
Payer: MEDICARE

## 2017-06-27 ENCOUNTER — APPOINTMENT (OUTPATIENT)
Dept: LAB | Facility: MEDICAL CENTER | Age: 82
End: 2017-06-27
Attending: INTERNAL MEDICINE
Payer: MEDICARE

## 2017-06-27 ENCOUNTER — TELEMEDICINE2 (OUTPATIENT)
Dept: ENDOCRINOLOGY | Facility: MEDICAL CENTER | Age: 82
End: 2017-06-27
Payer: MEDICARE

## 2017-06-27 VITALS
DIASTOLIC BLOOD PRESSURE: 72 MMHG | HEART RATE: 46 BPM | BODY MASS INDEX: 32.07 KG/M2 | HEIGHT: 70 IN | SYSTOLIC BLOOD PRESSURE: 136 MMHG | OXYGEN SATURATION: 97 % | WEIGHT: 224 LBS

## 2017-06-27 DIAGNOSIS — Z79.4 TYPE 2 DIABETES MELLITUS WITHOUT COMPLICATION, WITH LONG-TERM CURRENT USE OF INSULIN (HCC): ICD-10-CM

## 2017-06-27 DIAGNOSIS — E78.2 MIXED HYPERLIPIDEMIA: ICD-10-CM

## 2017-06-27 DIAGNOSIS — E11.9 TYPE 2 DIABETES MELLITUS WITHOUT COMPLICATION, WITH LONG-TERM CURRENT USE OF INSULIN (HCC): ICD-10-CM

## 2017-06-27 DIAGNOSIS — I10 ESSENTIAL HYPERTENSION: ICD-10-CM

## 2017-06-27 PROCEDURE — 99214 OFFICE O/P EST MOD 30 MIN: CPT | Mod: GT | Performed by: INTERNAL MEDICINE

## 2017-06-27 NOTE — MR AVS SNAPSHOT
"        John Hernandez   2017 10:20 AM   Telemedicine2   MRN: [id number]    Department:  Endocrinology [facility]   Dept Phone:  [phone]    Description:  Male : 1934   Provider:  Sky Malloy M.D.; TELEMEDICINE KM; TELEMED ENDOCRINOLOGY MD           Reason for Visit     Follow-Up [id number]      Allergies as of 2017     No Known Allergies      You were diagnosed with     Type 2 diabetes mellitus without complication, with long-term current use of insulin (CMS-HCC)   [1030280]       Essential hypertension   [2705404]       Mixed hyperlipidemia   [272.2.ICD-9-CM]         Vital Signs     Blood Pressure Pulse Height Weight Body Mass Index Oxygen Saturation    136/72 mmHg 46 1.778 m (5' 10\") 101.606 kg (224 lb) 32.14 kg/m2 97%    Smoking Status                   Never Smoker            Basic Information     Date Of Birth Sex Race Ethnicity Preferred Language    1934 Male White Non- English      Your appointments     2017  8:45 AM   Anti-Coag Routine with Zapata ANTI-COAG   [facility] ([place])    [address] [place] [address] [place] [id number] [phone]            Aug 04, 2017  8:20 AM   Established Patient with AIYANA Easley   [facility] ([place])    [address] [place] [address] [place] [id number] [phone]           You will be receiving a confirmation call a few days before your appointment from our automated call confirmation system.              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   5/3/2013 - Present    Hypertension I10   5/3/2013 - Present    Type 2 diabetes mellitus with complication (CMS-HCC) E11.8   5/3/2013 - Present    PVD (peripheral vascular disease) (CMS-HCC) I73.9   5/3/2013 - Present    Atrial fibrillation (CMS-HCC) I48.91   5/3/2013 - Present    Esophageal reflux K21.9   5/3/2013 - Present    Dyslipidemia " E78.5   5/3/2013 - Present    Atrial fibrillation (CMS-HCC) I48.91   9/25/2015 - Present    Amputation of foot, left, traumatic, 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, T87.9   11/9/2016 - Present    Diabetic complication (CMS-HCC) E11.8   1/5/2017 - Present    Bilateral hearing loss H91.93   2/10/2017 - Present    Long term (current) use of anticoagulants [Z79.01] Z79.01   3/17/2017 - Present      Health Maintenance        Date Due Completion Dates    RETINAL SCREENING 7/27/1952 ---    IMM DTaP/Tdap/Td Vaccine (1 - Tdap) 7/27/1953 ---    A1C SCREENING 9/25/2017 3/25/2017, 11/15/2016, 10/12/2006, 7/6/2006, 4/5/2006, 1/3/2006    FASTING LIPID PROFILE 11/15/2017 11/15/2016, 10/12/2006, 7/6/2006, 4/5/2006    URINE ACR / MICROALBUMIN 11/15/2017 11/15/2016, 1/3/2006    DIABETES MONOFILAMENT / LE EXAM 2/10/2018 2/10/2017    SERUM CREATININE 2/11/2018 2/11/2017, 5/4/2013, 4/5/2006            Current Immunizations     13-VALENT PCV PREVNAR 4/26/2016    Influenza TIV (IM) 11/1/2012    Influenza Vaccine Adult HD 8/19/2016    Pneumococcal polysaccharide vaccine (PPSV-23) 1/19/2011    SHINGLES VACCINE 11/3/2016      Below and/or attached are the medications your provider expects you to take. Review all of your home medications and newly ordered medications with your provider and/or pharmacist. Follow medication instructions as directed by your provider and/or pharmacist. Please keep your medication list with you and share with your provider. Update the information when medications are discontinued, doses are changed, or new medications (including over-the-counter products) are added; and carry medication information at all times in the event of emergency situations     Allergies:  No Known Allergies          Medications  Valid as of: June 27, 2017 - 10:46 AM    Generic Name Brand Name Tablet Size Instructions for use    AmLODIPine Besylate (Tab) NORVASC 5 MG Take 5 mg by mouth every day.        Cholecalciferol (Tab) cholecalciferol  "1000 UNIT Take 1,000 Units by mouth every day.        Finasteride (Tab) PROSCAR 5 MG Take 5 mg by mouth every day.        Furosemide (Tab) LASIX 40 MG Take 40 mg by mouth every day.        HydrALAZINE HCl (Tab) APRESOLINE 25 MG Take 25 mg by mouth 3 times a day.        Insulin NPH Isophane & Regular (Suspension) HUMULIN,NOVOLIN (70-30) 100 UNIT/ML Inject 18 Units as instructed 3 times a day before meals.        Insulin Syringe-Needle U-100 (Misc) INSULIN SYRINGE .5CC/31GX5/16\" 31G X 5/16\" 0.5 ML For insulin injections 3 times a day        Omeprazole (CAPSULE DELAYED RELEASE) PRILOSEC 20 MG Take 2 Caps by mouth every day.        Pioglitazone HCl (Tab) ACTOS 15 MG Take 1 Tab by mouth every day.        Potassium Chloride (Tab CR) KLOR-CON 10 MEQ Take 20 mEq by mouth every day.        Pravastatin Sodium (Tab) PRAVACHOL 20 MG Take 2 Tabs by mouth every day.        Valsartan-Hydrochlorothiazide (Tab) DIOVAN--12.5 MG Take 0.5 Tabs by mouth every day.        Valsartan-Hydrochlorothiazide (Tab) DIOVAN--12.5 MG Take 1 Tab by mouth every day.        Warfarin Sodium (Tab) COUMADIN 6 MG Take 1 Tab by mouth every day at 6 PM.        .                 Medicines prescribed today were sent to:     [facility] [address] [place] [address] - [facility] [address] [address]    Phone: [phone] Fax: [phone]    Open 24 Hours?: No      Medication refill instructions:       If your prescription bottle indicates you have medication refills left, it is not necessary to call your provider’s office. Please contact your pharmacy and they will refill your medication.    If your prescription bottle indicates you do not have any refills left, you may request refills at any time through one of the following ways: The online [employer] system (except Urgent Care), by calling your provider’s office, or by asking your pharmacy to contact your provider’s office with a refill request. Medication " refills are processed only during regular business hours and may not be available until the next business day. Your provider may request additional information or to have a follow-up visit with you prior to refilling your medication.   *Please Note: Medication refills are assigned a new Rx number when refilled electronically. Your pharmacy may indicate that no refills were authorized even though a new prescription for the same medication is available at the pharmacy. Please request the medicine by name with the pharmacy before contacting your provider for a refill.        Your To Do List     Future Labs/Procedures Complete By Expires    HEMOGLOBIN A1C  As directed 6/27/2018         iWantoo Access Code: [email or web address]  Expires: 6/30/2017 10:05 AM    iWantoo  A secure, online tool to manage your health information     [employer]’s iWantoo® is a secure, online tool that connects you to your personalized health information from the privacy of your home -- day or night - making it very easy for you to manage your healthcare. Once the activation process is completed, you can even access your medical information using the iWantoo mallorie, which is available for free in the Apple Mallorie store or Google Play store.     iWantoo provides the following levels of access (as shown below):   My Chart Features   [facility] Doctor [facility]  Urgent  Care Non-[facility]  Primary Care  Doctor   Email your healthcare team securely and privately 24/7 X X X    Manage appointments: schedule your next appointment; view details of past/upcoming appointments X      Request prescription refills. X      View recent personal medical records, including lab and immunizations X X X X   View health record, including health history, allergies, medications X X X X   Read reports about your outpatient visits, procedures, consult and ER notes X X X X   See your discharge summary, which is a recap of your hospital and/or ER visit  that includes your diagnosis, lab results, and care plan. X X       How to register for [employer] Style:  1. Go to  [email or web address].  2. Click on the Sign Up Now box, which takes you to the New Member Sign Up page. You will need to provide the following information:  a. Enter your Rank & Style Access Code exactly as it appears at the top of this page. (You will not need to use this code after you’ve completed the sign-up process. If you do not sign up before the expiration date, you must request a new code.)   b. Enter your date of birth.   c. Enter your home email address.   d. Click Submit, and follow the next screen’s instructions.  3. Create a PromisePayt ID. This will be your PromisePayt login ID and cannot be changed, so think of one that is secure and easy to remember.  4. Create a PromisePayt password. You can change your password at any time.  5. Enter your Password Reset Question and Answer. This can be used at a later time if you forget your password.   6. Enter your e-mail address. This allows you to receive e-mail notifications when new information is available in Rank & Style.  7. Click Sign Up. You can now view your health information.    For assistance activating your Rank & Style account, call [phone]

## 2017-06-27 NOTE — PROGRESS NOTES
Endocrinology Clinic Progress Note (Telemedicine visit)  Primary care physician: AIYANA Easley    CC: Routine follow-up for diabetes    HPI:  John Hernandez is a 82 y.o. old patient with history of type 2 diabetes here for follow-up.    Type 2 diabetes: He is currently on Novolin 70/30, he takes 20 units with breakfast and 20 units with dinner. Reports compliance medications. He checks blood sugars twice a day. Most blood sugar readings are in the range of 130-170. Rare hypoglycemia, he can feel the low blood sugar symptoms well.    Hypertension: Blood pressure is well controlled. He is on ARB.    Hyperlipidemia: LDL 50. He is currently on pravastatin.    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 at SURGERY [facility] ORS       Allergies:  Review of patient's allergies indicates no known allergies.    Social History:  Social History     Social History   • Marital Status: Single     Spouse Name: N/A   • Number of Children: N/A   • Years of Education: N/A     Occupational History   • Not on file.  "    Social History Main Topics   • Smoking status: Never Smoker    • Smokeless tobacco: Former User     Quit date: 05/04/2010   • Alcohol Use: No   • Drug Use: No   • Sexual Activity: Not on file     Other Topics Concern   • Not on file     Social History Narrative       Family History:  History reviewed. No pertinent family history.    Medications:    Current outpatient prescriptions:   •  pravastatin (PRAVACHOL) 20 MG Tab, Take 2 Tabs by mouth every day., Disp: 180 Tab, Rfl: 2  •  valsartan-hydrochlorothiazide (DIOVAN HCT) 320-12.5 MG per tablet, Take 1 Tab by mouth every day., Disp: 30 Tab, Rfl: 3  •  pioglitazone (ACTOS) 15 MG Tab, Take 1 Tab by mouth every day., Disp: 30 Tab, Rfl: 4  •  omeprazole (PRILOSEC) 20 MG delayed-release capsule, Take 2 Caps by mouth every day., Disp: 60 Cap, Rfl: 3  •  warfarin (COUMADIN) 6 MG Tab, Take 1 Tab by mouth every day at 6 PM., Disp: 90 Tab, Rfl: 1  •  insulin 70/30 (NOVOLIN 70/30 RELION) (70-30) 100 UNIT/ML Suspension, Inject 18 Units as instructed 3 times a day before meals., Disp: 20 mL, Rfl: 6  •  Insulin Syringe-Needle U-100 (INSULIN SYRINGE .5CC/31GX5/16\") 31G X 5/16\" 0.5 ML Misc, For insulin injections 3 times a day, Disp: 300 Each, Rfl: 2  •  furosemide (LASIX) 40 MG Tab, Take 40 mg by mouth every day., Disp: , Rfl:   •  hydrALAZINE (APRESOLINE) 25 MG TABS, Take 25 mg by mouth 3 times a day., Disp: , Rfl:   •  potassium chloride CR (K-TABS) 10 MEQ tablet, Take 20 mEq by mouth every day., Disp: , Rfl:   •  finasteride (PROSCAR) 5 MG TABS, Take 5 mg by mouth every day., Disp: , Rfl:   •  vitamin D (CHOLECALCIFEROL) 1000 UNIT TABS, Take 1,000 Units by mouth every day., Disp: , Rfl:   •  valsartan-hydrochlorothiazide (DIOVAN-HCT) 320-12.5 MG per tablet, Take 0.5 Tabs by mouth every day., Disp: , Rfl:   •  amlodipine (NORVASC) 5 MG TABS, Take 5 mg by mouth every day., Disp: , Rfl:     Labs:  Results for TANIYA MONTEZ (MRN [id number]) as of 6/27/2017 10:32   Ref. Range " "11/15/2016 07:06 2/11/2017 07:45 3/25/2017 07:29   Sodium Latest Ref Range: 135-145 mmol/L  142    Potassium Latest Ref Range: 3.6-5.5 mmol/L  4.2    Chloride Latest Ref Range:  mmol/L  107    Co2 Latest Ref Range: 20-33 mmol/L  26    Anion Gap Latest Ref Range: 0.0-11.9   9.0    Glucose Latest Ref Range: 65-99 mg/dL  105 (H)    Bun Latest Ref Range: 8-22 mg/dL  29 (H)    Creatinine Latest Ref Range: 0.50-1.40 mg/dL  1.15    GFR If  Latest Ref Range: >60 mL/min/1.73 m 2  >60    GFR If Non  Latest Ref Range: >60 mL/min/1.73 m 2  >60    Calcium Latest Ref Range: 8.5-10.5 mg/dL  9.3    AST(SGOT) Latest Ref Range: 12-45 U/L  20    ALT(SGPT) Latest Ref Range: 2-50 U/L  15    Alkaline Phosphatase Latest Ref Range: 30-99 U/L  59    Total Bilirubin Latest Ref Range: 0.1-1.5 mg/dL  1.7 (H)    Albumin Latest Ref Range: 3.2-4.9 g/dL  3.9    Total Protein Latest Ref Range: 6.0-8.2 g/dL  6.8    Globulin Latest Ref Range: 1.9-3.5 g/dL  2.9    A-G Ratio Latest Units: g/dL  1.3    Glycohemoglobin Latest Ref Range: 0.0-5.6 % 7.4 (H)  6.8 (H)   Estim. Avg Glu Latest Units: mg/dL 166  148   Cholesterol,Tot Latest Ref Range: 100-199 mg/dL 93 (L)     Triglycerides Latest Ref Range: 0-149 mg/dL 48     HDL Latest Ref Range: >=40 mg/dL 33 (A)     LDL Latest Ref Range: <100 mg/dL 50     Micro Alb Creat Ratio Latest Ref Range: 0-30 mg/g 38 (H)     Creatinine, Urine Latest Units: mg/dL 88.50     Microalbumin, Urine Random Latest Units: mg/dL 3.4       Physical Examination:  Vital signs: /72 mmHg  Pulse 46  Ht 1.778 m (5' 10\")  Wt 101.606 kg (224 lb)  BMI 32.14 kg/m2  SpO2 97%  General: No apparent distress, cooperative  Eyes: No obvious exophthalmos  ENMT: Normal on external inspection of nose, lips  Neck: On visual inspection no obvious mass seen  Resp: Normal effort  Neuro: Alert and oriented  Skin: No rash on visible skin  Psych: Normal mood and affect    Assessment and Plan:    Type 2 " diabetes mellitus without complication, with long-term current use of insulin (CMS-[facility])  · Hemoglobin A1c in March was 6.8%  · Goal hemoglobin A1c less than 8%  · Advised to continue checking blood sugars twice a day  · Continue Novolin 70/30 20 units with breakfast and 20 units with dinner  · Repeat A1c  -     HEMOGLOBIN A1C; Future    Essential hypertension  · Blood pressure is well controlled  · Continue ARB    Mixed hyperlipidemia  · LDL 50  · Continue pravastatin    Return in about 4 months (around 10/27/2017).    Thank you for allowing me to participate in the care of this patient.    This visit was conducted utilizing secure and encrypted videoconferencing equipment with the assistance of a trained tele-presenter at the originating site.    Sky Malloy M.D.  06/27/2017    CC:   Ria Alford, [email or web address].RKIT.    This note was created using voice recognition software (Dragon). The accuracy of the dictation is limited by the abilities of the software. I have reviewed the note prior to signing, however some errors in grammar and context are still possible. If you have any questions related to this note please do not hesitate to contact our office.

## 2017-06-29 ENCOUNTER — HOSPITAL ENCOUNTER (OUTPATIENT)
Dept: LAB | Facility: MEDICAL CENTER | Age: 82
End: 2017-06-29
Attending: INTERNAL MEDICINE
Payer: MEDICARE

## 2017-06-29 DIAGNOSIS — Z79.4 TYPE 2 DIABETES MELLITUS WITHOUT COMPLICATION, WITH LONG-TERM CURRENT USE OF INSULIN (HCC): ICD-10-CM

## 2017-06-29 DIAGNOSIS — E11.9 TYPE 2 DIABETES MELLITUS WITHOUT COMPLICATION, WITH LONG-TERM CURRENT USE OF INSULIN (HCC): ICD-10-CM

## 2017-06-29 LAB
EST. AVERAGE GLUCOSE BLD GHB EST-MCNC: 123 MG/DL
HBA1C MFR BLD: 5.9 % (ref 0–5.6)

## 2017-06-29 PROCEDURE — 36415 COLL VENOUS BLD VENIPUNCTURE: CPT | Mod: GA

## 2017-06-29 PROCEDURE — 83036 HEMOGLOBIN GLYCOSYLATED A1C: CPT | Mod: GA

## 2017-07-10 RX ORDER — PIOGLITAZONEHYDROCHLORIDE 15 MG/1
15 TABLET ORAL DAILY
Qty: 30 TAB | Refills: 4 | Status: SHIPPED | OUTPATIENT
Start: 2017-07-10 | End: 2017-12-13 | Stop reason: SDUPTHER

## 2017-07-13 NOTE — PROGRESS NOTES
Outcome: Patient declined. Could not understand me very well and was confused about the conversation. Said to send him a letter. Unable to discuss anything else.

## 2017-07-14 ENCOUNTER — ANTICOAGULATION VISIT (OUTPATIENT)
Dept: MEDICAL GROUP | Facility: PHYSICIAN GROUP | Age: 82
End: 2017-07-14
Payer: MEDICARE

## 2017-07-14 VITALS — DIASTOLIC BLOOD PRESSURE: 63 MMHG | SYSTOLIC BLOOD PRESSURE: 130 MMHG | HEART RATE: 54 BPM

## 2017-07-14 DIAGNOSIS — Z79.01 LONG TERM (CURRENT) USE OF ANTICOAGULANTS: ICD-10-CM

## 2017-07-14 DIAGNOSIS — I48.91 ATRIAL FIBRILLATION, UNSPECIFIED TYPE (HCC): ICD-10-CM

## 2017-07-14 LAB — INR PPP: 1.5 (ref 2–3.5)

## 2017-07-14 PROCEDURE — 85610 PROTHROMBIN TIME: CPT | Performed by: PHYSICIAN ASSISTANT

## 2017-07-14 RX ORDER — VALSARTAN AND HYDROCHLOROTHIAZIDE 320; 12.5 MG/1; MG/1
1 TABLET, FILM COATED ORAL DAILY
Qty: 90 TAB | Refills: 0 | Status: SHIPPED | OUTPATIENT
Start: 2017-07-14 | End: 2017-10-16 | Stop reason: SDUPTHER

## 2017-07-14 NOTE — PROGRESS NOTES
"Anticoagulation Summary as of 7/14/2017     INR goal 2.0-3.0   Selected INR 1.5! (7/14/2017)   Maintenance plan 6 mg (6 mg x 1) on Wed, Fri; 3 mg (6 mg x 0.5) all other days   Weekly total 27 mg   Plan last modified Keith Collado, PHARMD (3/17/2017)   Next INR check 8/4/2017   Target end date Indefinite    Indications   Atrial fibrillation (CMS-HCC) [I48.91]  Long term (current) use of anticoagulants [Z79.01] [Z79.01]         Anticoagulation Episode Summary     INR check location [facility]    Preferred lab     Send INR reminders to     Comments TTR is 45.05%, consider alternative      Anticoagulation Care Providers     Provider Role Specialty Phone number    Dontae Ng D.O. [facility] Cardiology [phone]    [facility]ticoagulation Services   [phone]    AIYANA Easley  Family Medicine [phone]        Anticoagulation Patient Findings      Current Outpatient Prescriptions on File Prior to Visit   Medication Sig Dispense Refill   • pioglitazone (ACTOS) 15 MG Tab Take 1 Tab by mouth every day. 30 Tab 4   • pravastatin (PRAVACHOL) 20 MG Tab Take 2 Tabs by mouth every day. 180 Tab 2   • valsartan-hydrochlorothiazide (DIOVAN HCT) 320-12.5 MG per tablet Take 1 Tab by mouth every day. 30 Tab 3   • omeprazole (PRILOSEC) 20 MG delayed-release capsule Take 2 Caps by mouth every day. 60 Cap 3   • warfarin (COUMADIN) 6 MG Tab Take 1 Tab by mouth every day at 6 PM. 90 Tab 1   • insulin 70/30 (NOVOLIN 70/30 RELION) (70-30) 100 UNIT/ML Suspension Inject 18 Units as instructed 3 times a day before meals. 20 mL 6   • Insulin Syringe-Needle U-100 (INSULIN SYRINGE .5CC/31GX5/16\") 31G X 5/16\" 0.5 ML Misc For insulin injections 3 times a day 300 Each 2   • furosemide (LASIX) 40 MG Tab Take 40 mg by mouth every day.     • hydrALAZINE (APRESOLINE) 25 MG TABS Take 25 mg by mouth 3 times a day.     • valsartan-hydrochlorothiazide (DIOVAN-HCT) 320-12.5 MG per tablet Take 0.5 Tabs by mouth every day.     • " potassium chloride CR (K-TABS) 10 MEQ tablet Take 20 mEq by mouth every day.     • amlodipine (NORVASC) 5 MG TABS Take 5 mg by mouth every day.     • finasteride (PROSCAR) 5 MG TABS Take 5 mg by mouth every day.     • vitamin D (CHOLECALCIFEROL) 1000 UNIT TABS Take 1,000 Units by mouth every day.       No current facility-administered medications on file prior to visit.       Lab Results   Component Value Date/Time    SODIUM 142 02/11/2017 07:45 AM    POTASSIUM 4.2 02/11/2017 07:45 AM    CHLORIDE 107 02/11/2017 07:45 AM    CO2 26 02/11/2017 07:45 AM    GLUCOSE 105* 02/11/2017 07:45 AM    BUN 29* 02/11/2017 07:45 AM    CREATININE 1.15 02/11/2017 07:45 AM          John Hernandez seen in clinic today  INR  sub-therapeutic.    Denies signs/symptoms of bleeding and/or thrombosis.    Denies changes to diet or medications.   Follow up appointment in 3 week(s) per pt, he has another appt that day     9mg x two then continue weekly warfarin dose as noted     Keith Collado, PHARMD

## 2017-07-14 NOTE — MR AVS SNAPSHOT
John Hernandez   2017 8:45 AM   Anticoagulation Visit   MRN: [id number]    Department:  [facility]   Dept Phone:  [phone]    Description:  Male : 1934   Provider:  KM ANTI-COAG           Allergies as of 2017     No Known Allergies      You were diagnosed with     Long term (current) use of anticoagulants   [V58.61.ICD-9-CM]       Atrial fibrillation, unspecified type (CMS-HCC)   [1464681]         Vital Signs     Blood Pressure Pulse Smoking Status             130/63 mmHg 54 Never Smoker          Basic Information     Date Of Birth Sex Race Ethnicity Preferred Language    1934 Male White Non- English      Your appointments     Aug 04, 2017  8:20 AM   Established Patient with AIYANA Easley   [facility] ([place])    [address] [place] [id number] [phone]           You will be receiving a confirmation call a few days before your appointment from our automated call confirmation system.            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   5/3/2013 - Present    Hypertension I10   5/3/2013 - Present    Type 2 diabetes mellitus with complication (CMS-HCC) E11.8   5/3/2013 - Present    PVD (peripheral vascular disease) (CMS-HCC) I73.9   5/3/2013 - Present    Atrial fibrillation (CMS-HCC) I48.91   5/3/2013 - Present    Esophageal reflux K21.9   5/3/2013 - Present    Dyslipidemia E78.5   5/3/2013 - Present    Atrial fibrillation (CMS-HCC) I48.91   2015 - Present    Amputation of foot, left, traumatic, 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, 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.9   2016 - Present    Diabetic complication (CMS-HCC) E11.8   2017 - Present    Bilateral hearing loss H91.93    2/10/2017 - Present    Long term (current) use of anticoagulants [Z79.01] Z79.01   3/17/2017 - Present      Health Maintenance        Date Due Completion Dates    RETINAL SCREENING 7/27/1952 ---    IMM DTaP/Tdap/Td Vaccine (1 - Tdap) 7/27/1953 ---    IMM INFLUENZA (1) 9/1/2017 8/19/2016, 11/1/2012    FASTING LIPID PROFILE 11/15/2017 11/15/2016, 10/12/2006, 7/6/2006, 4/5/2006    URINE ACR / MICROALBUMIN 11/15/2017 11/15/2016, 1/3/2006    A1C SCREENING 12/29/2017 6/29/2017, 3/25/2017, 11/15/2016, 10/12/2006, 7/6/2006, 4/5/2006, 1/3/2006    DIABETES MONOFILAMENT / LE EXAM 2/10/2018 2/10/2017    SERUM CREATININE 2/11/2018 2/11/2017, 5/4/2013, 4/5/2006            Results     POCT Protime      Component    INR    1.5    Comment:     ic valid [id number] 160 exp 03/2018                        Current Immunizations     13-VALENT PCV PREVNAR 4/26/2016    Influenza TIV (IM) 11/1/2012    Influenza Vaccine Adult HD 8/19/2016    Pneumococcal polysaccharide vaccine (PPSV-23) 1/19/2011    SHINGLES VACCINE 11/3/2016      Below and/or attached are the medications your provider expects you to take. Review all of your home medications and newly ordered medications with your provider and/or pharmacist. Follow medication instructions as directed by your provider and/or pharmacist. Please keep your medication list with you and share with your provider. Update the information when medications are discontinued, doses are changed, or new medications (including over-the-counter products) are added; and carry medication information at all times in the event of emergency situations     Allergies:  No Known Allergies          Medications  Valid as of: July 14, 2017 -  8:50 AM    Generic Name Brand Name Tablet Size Instructions for use    AmLODIPine Besylate (Tab) NORVASC 5 MG Take 5 mg by mouth every day.        Cholecalciferol (Tab) cholecalciferol 1000 UNIT Take 1,000 Units by mouth every day.        Finasteride (Tab) PROSCAR 5 MG Take 5 mg by  "mouth every day.        Furosemide (Tab) LASIX 40 MG Take 40 mg by mouth every day.        HydrALAZINE HCl (Tab) APRESOLINE 25 MG Take 25 mg by mouth 3 times a day.        Insulin NPH Isophane & Regular (Suspension) HUMULIN,NOVOLIN (70-30) 100 UNIT/ML Inject 18 Units as instructed 3 times a day before meals.        Insulin Syringe-Needle U-100 (Misc) INSULIN SYRINGE .5CC/31GX5/16\" 31G X 5/16\" 0.5 ML For insulin injections 3 times a day        Omeprazole (CAPSULE DELAYED RELEASE) PRILOSEC 20 MG Take 2 Caps by mouth every day.        Pioglitazone HCl (Tab) ACTOS 15 MG Take 1 Tab by mouth every day.        Potassium Chloride (Tab CR) KLOR-CON 10 MEQ Take 20 mEq by mouth every day.        Pravastatin Sodium (Tab) PRAVACHOL 20 MG Take 2 Tabs by mouth every day.        Valsartan-Hydrochlorothiazide (Tab) DIOVAN--12.5 MG Take 0.5 Tabs by mouth every day.        Valsartan-Hydrochlorothiazide (Tab) DIOVAN--12.5 MG Take 1 Tab by mouth every day.        Warfarin Sodium (Tab) COUMADIN 6 MG Take 1 Tab by mouth every day at 6 PM.        .                 Medicines prescribed today were sent to:     [facility] [address] [place] [id number]    Phone: [phone] Fax: [phone]    Open 24 Hours?: No      Medication refill instructions:       If your prescription bottle indicates you have medication refills left, it is not necessary to call your provider’s office. Please contact your pharmacy and they will refill your medication.    If your prescription bottle indicates you do not have any refills left, you may request refills at any time through one of the following ways: The online TinyTap system (except Urgent Care), by calling your provider’s office, or by asking your pharmacy to contact your provider’s office with a refill request. Medication refills are processed only during regular business hours and may not be available until the next " business day. Your provider may request additional information or to have a follow-up visit with you prior to refilling your medication.   *Please Note: Medication refills are assigned a new Rx number when refilled electronically. Your pharmacy may indicate that no refills were authorized even though a new prescription for the same medication is available at the pharmacy. Please request the medicine by name with the pharmacy before contacting your provider for a refill.        Warfarin Dosing Calendar   July 2017 Details    Sun Mon Tue Wed Thu Fri Sat           1                 2               3               4               5               6               7               8                 9               10               11               12               13               14   1.5   9 mg   See details      15      9 mg           16      3 mg         17      3 mg         18      3 mg         19      6 mg         20      3 mg         21      6 mg         22      3 mg           23      3 mg         24      3 mg         25      3 mg         26      6 mg         27      3 mg         28      6 mg         29      3 mg           30      3 mg         31      3 mg               Date Details   07/14 This INR check   INR: 1.5   ic valid [id number] 160 exp 03/2018               How to take your warfarin dose     To take:  3 mg Take 0.5 of a 6 mg tablet.    To take:  6 mg Take 1 of the 6 mg tablets.    To take:  9 mg Take 1.5 of the 6 mg tablets.           Warfarin Dosing Calendar   August 2017 Details    Sun Mon Tue Wed Thu Fri Sat       1      3 mg         2      6 mg         3      3 mg         4      6 mg         5                 6               7               8               9               10               11               12                 13               14               15               16               17               18               19                 20               21               22               23                 24               25               26                 27               28               29               30               31                  Date Details   No additional details    Date of next INR:  8/4/2017         How to take your warfarin dose     To take:  3 mg Take 0.5 of a 6 mg tablet.    To take:  6 mg Take 1 of the 6 mg tablets.              BluPanda Access Code: [email or web address]  Expires: 8/13/2017  8:50 AM    BluPanda  A secure, online tool to manage your health information     [email or web address]’s BluPanda® is a secure, online tool that connects you to your personalized health information from the privacy of your home -- day or night - making it very easy for you to manage your healthcare. Once the activation process is completed, you can even access your medical information using the BluPanda mallorie, which is available for free in the Apple Mallorie store or Google Play store.     BluPanda provides the following levels of access (as shown below):   My Chart Features   [facility] Primary Care Doctor [facility]  Specialists [facility]  Urgent  Care Non-[facility]  Primary Care  Doctor   Email your healthcare team securely and privately 24/7 X X X    Manage appointments: schedule your next appointment; view details of past/upcoming appointments X      Request prescription refills. X      View recent personal medical records, including lab and immunizations X X X X   View health record, including health history, allergies, medications X X X X   Read reports about your outpatient visits, procedures, consult and ER notes X X X X   See your discharge summary, which is a recap of your hospital and/or ER visit that includes your diagnosis, lab results, and care plan. X X       How to register for BluPanda:  1. Go to  [email or web address].  2. Click on the Sign Up Now box, which takes you to the New Member Sign Up page. You will need to provide the following information:  a. Enter your BluPanda Access Code exactly as it appears at the  top of this page. (You will not need to use this code after you’ve completed the sign-up process. If you do not sign up before the expiration date, you must request a new code.)   b. Enter your date of birth.   c. Enter your home email address.   d. Click Submit, and follow the next screen’s instructions.  3. Create a Teez.by ID. This will be your Teez.by login ID and cannot be changed, so think of one that is secure and easy to remember.  4. Create a Teez.by password. You can change your password at any time.  5. Enter your Password Reset Question and Answer. This can be used at a later time if you forget your password.   6. Enter your e-mail address. This allows you to receive e-mail notifications when new information is available in Teez.by.  7. Click Sign Up. You can now view your health information.    For assistance activating your Teez.by account, call [phone]

## 2017-07-14 NOTE — TELEPHONE ENCOUNTER
Was the patient seen in the last year in this department? Yes     Does patient have an active prescription for medications requested? No     Received Request Via: Patient     Patient is currently out.

## 2017-07-17 RX ORDER — VALSARTAN AND HYDROCHLOROTHIAZIDE 320; 12.5 MG/1; MG/1
1 TABLET, FILM COATED ORAL DAILY
Qty: 30 TAB | Refills: 3 | OUTPATIENT
Start: 2017-07-17

## 2017-08-01 ENCOUNTER — TELEPHONE (OUTPATIENT)
Dept: ENDOCRINOLOGY | Facility: MEDICAL CENTER | Age: 82
End: 2017-08-01

## 2017-08-01 DIAGNOSIS — Z79.4 TYPE 2 DIABETES MELLITUS WITH HYPERGLYCEMIA, WITH LONG-TERM CURRENT USE OF INSULIN (HCC): ICD-10-CM

## 2017-08-01 DIAGNOSIS — E11.65 TYPE 2 DIABETES MELLITUS WITH HYPERGLYCEMIA, WITH LONG-TERM CURRENT USE OF INSULIN (HCC): ICD-10-CM

## 2017-08-01 NOTE — TELEPHONE ENCOUNTER
Pt asking a new Rx for One touch Ultra blue test tsrips.    Pt testing 2x day.  90 days supply.    [facility]    Thank you  Leah

## 2017-08-04 ENCOUNTER — OFFICE VISIT (OUTPATIENT)
Dept: MEDICAL GROUP | Facility: PHYSICIAN GROUP | Age: 82
End: 2017-08-04
Payer: MEDICARE

## 2017-08-04 ENCOUNTER — ANTICOAGULATION VISIT (OUTPATIENT)
Dept: MEDICAL GROUP | Facility: PHYSICIAN GROUP | Age: 82
End: 2017-08-04
Payer: MEDICARE

## 2017-08-04 VITALS
BODY MASS INDEX: 32.07 KG/M2 | TEMPERATURE: 98.8 F | SYSTOLIC BLOOD PRESSURE: 122 MMHG | HEART RATE: 90 BPM | WEIGHT: 224 LBS | RESPIRATION RATE: 16 BRPM | HEIGHT: 70 IN | DIASTOLIC BLOOD PRESSURE: 52 MMHG | OXYGEN SATURATION: 92 %

## 2017-08-04 VITALS — SYSTOLIC BLOOD PRESSURE: 135 MMHG | HEART RATE: 113 BPM | DIASTOLIC BLOOD PRESSURE: 60 MMHG

## 2017-08-04 DIAGNOSIS — Z79.01 LONG TERM (CURRENT) USE OF ANTICOAGULANTS: ICD-10-CM

## 2017-08-04 DIAGNOSIS — E78.5 DYSLIPIDEMIA: ICD-10-CM

## 2017-08-04 DIAGNOSIS — E66.9 OBESITY (BMI 30-39.9): ICD-10-CM

## 2017-08-04 DIAGNOSIS — I10 ESSENTIAL HYPERTENSION: ICD-10-CM

## 2017-08-04 DIAGNOSIS — E11.8 TYPE 2 DIABETES MELLITUS WITH COMPLICATION, WITH LONG-TERM CURRENT USE OF INSULIN (HCC): ICD-10-CM

## 2017-08-04 DIAGNOSIS — I48.91 ATRIAL FIBRILLATION, UNSPECIFIED TYPE (HCC): ICD-10-CM

## 2017-08-04 DIAGNOSIS — Z79.4 TYPE 2 DIABETES MELLITUS WITH COMPLICATION, WITH LONG-TERM CURRENT USE OF INSULIN (HCC): ICD-10-CM

## 2017-08-04 LAB — INR PPP: 1.6 (ref 2–3.5)

## 2017-08-04 PROCEDURE — 99214 OFFICE O/P EST MOD 30 MIN: CPT | Performed by: NURSE PRACTITIONER

## 2017-08-04 PROCEDURE — 85610 PROTHROMBIN TIME: CPT | Performed by: PHYSICIAN ASSISTANT

## 2017-08-04 NOTE — ASSESSMENT & PLAN NOTE
This is a chronic condition, stable and well-controlled on current regimen. Patient's blood pressure is 122/5 and he denies symptoms of hypertension. He tolerates his medications well with no significant bothersome side effects. He does not need refills at this time, but didn't call as needed. He will be due for labs again in 6 months, he is having these done before he follows up with me at that time.

## 2017-08-04 NOTE — PATIENT INSTRUCTIONS
Follow up with me in 6 months with labs before visit    Continue care with Cardiology and Endocrinology

## 2017-08-04 NOTE — PROGRESS NOTES
"Anticoagulation Summary as of 8/4/2017     INR goal 2.0-3.0   Selected INR 1.6! (8/4/2017)   Maintenance plan 6 mg (6 mg x 1) on Mon, Wed, Fri; 3 mg (6 mg x 0.5) all other days   Weekly total 30 mg   Plan last modified Keith Collado, PHARMD (8/4/2017)   Next INR check 8/25/2017   Target end date Indefinite    Indications   Atrial fibrillation (CMS-HCC) [I48.91]  Long term (current) use of anticoagulants [Z79.01] [Z79.01]         Anticoagulation Episode Summary     INR check location [facility]    Preferred lab     Send INR reminders to     Comments TTR is 45.05%, consider alternative      Anticoagulation Care Providers     Provider Role Specialty Phone number    Dontae Ng D.O. Referring Cardiology [phone]    [facility]   [phone]    LONNY EasleyRSUHAIL  Family Medicine [phone]        Anticoagulation Patient Findings      Current Outpatient Prescriptions on File Prior to Visit   Medication Sig Dispense Refill   • Blood Glucose Monitoring Suppl SUPPLIES Misc One Touch ultra blue glucometer strips for blood sugar checked twice a day, 90 days 200 Each 3   • valsartan-hydrochlorothiazide (DIOVAN HCT) 320-12.5 MG per tablet Take 1 Tab by mouth every day. 90 Tab 0   • pioglitazone (ACTOS) 15 MG Tab Take 1 Tab by mouth every day. 30 Tab 4   • pravastatin (PRAVACHOL) 20 MG Tab Take 2 Tabs by mouth every day. 180 Tab 2   • omeprazole (PRILOSEC) 20 MG delayed-release capsule Take 2 Caps by mouth every day. 60 Cap 3   • warfarin (COUMADIN) 6 MG Tab Take 1 Tab by mouth every day at 6 PM. 90 Tab 1   • insulin 70/30 (NOVOLIN 70/30 RELION) (70-30) 100 UNIT/ML Suspension Inject 18 Units as instructed 3 times a day before meals. 20 mL 6   • Insulin Syringe-Needle U-100 (INSULIN SYRINGE .5CC/31GX5/16\") 31G X 5/16\" 0.5 ML Misc For insulin injections 3 times a day 300 Each 2   • furosemide (LASIX) 40 MG Tab Take 40 mg by mouth every day.     • hydrALAZINE (APRESOLINE) 25 MG TABS " Take 25 mg by mouth 3 times a day.     • valsartan-hydrochlorothiazide (DIOVAN-HCT) 320-12.5 MG per tablet Take 0.5 Tabs by mouth every day.     • potassium chloride CR (K-TABS) 10 MEQ tablet Take 20 mEq by mouth every day.     • amlodipine (NORVASC) 5 MG TABS Take 5 mg by mouth every day.     • finasteride (PROSCAR) 5 MG TABS Take 5 mg by mouth every day.     • vitamin D (CHOLECALCIFEROL) 1000 UNIT TABS Take 1,000 Units by mouth every day.       No current facility-administered medications on file prior to visit.       Lab Results   Component Value Date/Time    SODIUM 142 02/11/2017 07:45 AM    POTASSIUM 4.2 02/11/2017 07:45 AM    CHLORIDE 107 02/11/2017 07:45 AM    CO2 26 02/11/2017 07:45 AM    GLUCOSE 105* 02/11/2017 07:45 AM    BUN 29* 02/11/2017 07:45 AM    CREATININE 1.15 02/11/2017 07:45 AM        John Hernandez seen in clinic today  INR  sub-therapeutic.    Denies signs/symptoms of bleeding and/or thrombosis.    Denies changes to diet or medications.   Follow up appointment in 3 week(s) per pt due to transportation issues     Increase weekly warfarin dose as noted     Keith Collado, PHARMD

## 2017-08-04 NOTE — MR AVS SNAPSHOT
"        John MOISE Mary   2017 8:20 AM   Office Visit   MRN: [id number]    Department:  [facility]   Dept Phone:  [phone]    Description:  Male : 1934   Provider:  AIYANA Easley           Reason for Visit     Diabetes fv Endo      Allergies as of 2017     No Known Allergies      You were diagnosed with     Essential hypertension   [4762294]       Dyslipidemia   [994232]         Vital Signs     Blood Pressure Pulse Temperature Respirations Height Weight    122/52 mmHg 90 37.1 °C (98.8 °F) 16 1.778 m (5' 10\") 101.606 kg (224 lb)    Body Mass Index Oxygen Saturation Smoking Status             32.14 kg/m2 92% Never Smoker          Basic Information     Date Of Birth Sex Race Ethnicity Preferred Language    1934 Male White Non- English      Your appointments     Aug 04, 2017  9:00 AM   Anti-Coag Routine with [place] PHARMACIST   [facility] ([place])    [address] [place] [address] [phone]            Aug 25, 2017  8:30 AM   Anti-Coag Routine with [place] PHARMACIST   [facility] ([place])    [address] [place] [address] [phone]            Oct 10, 2017 10:40 AM   Telemedicine Clinic Established Pt with Sky Malloy M.D., TELEMED ENDOCRINOLOGY MD, TELEMEDICINE [facility]    [address]  [facility] [address] [phone]              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   5/3/2013 - Present    Hypertension I10   5/3/2013 - Present    Type 2 diabetes mellitus with complication (CMS-HCC) E11.8   5/3/2013 - Present    PVD (peripheral vascular disease) (CMS-HCC) I73.9   5/3/2013 - Present    Atrial fibrillation (CMS-HCC) I48.91   5/3/2013 - Present    Esophageal reflux K21.9   5/3/2013 - Present    Dyslipidemia E78.5   5/3/2013 - Present "    Atrial fibrillation (CMS-HCC) I48.91   9/25/2015 - Present    Amputation of foot, left, traumatic, 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, 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.9   11/9/2016 - Present    Diabetic complication (CMS-HCC) E11.8   1/5/2017 - Present    Bilateral hearing loss H91.93   2/10/2017 - Present    Long term (current) use of anticoagulants [Z79.01] Z79.01   3/17/2017 - Present      Health Maintenance        Date Due Completion Dates    RETINAL SCREENING 7/27/1952 ---    IMM DTaP/Tdap/Td Vaccine (1 - Tdap) 7/27/1953 ---    IMM INFLUENZA (1) 9/1/2017 8/19/2016, 11/1/2012    FASTING LIPID PROFILE 11/15/2017 11/15/2016, 10/12/2006, 7/6/2006, 4/5/2006    URINE ACR / MICROALBUMIN 11/15/2017 11/15/2016, 1/3/2006    A1C SCREENING 12/29/2017 6/29/2017, 3/25/2017, 11/15/2016, 10/12/2006, 7/6/2006, 4/5/2006, 1/3/2006    DIABETES MONOFILAMENT / LE EXAM 2/10/2018 2/10/2017    SERUM CREATININE 2/11/2018 2/11/2017, 5/4/2013, 4/5/2006            Current Immunizations     13-VALENT PCV PREVNAR 4/26/2016    Influenza TIV (IM) 11/1/2012    Influenza Vaccine Adult HD 8/19/2016    Pneumococcal polysaccharide vaccine (PPSV-23) 1/19/2011    SHINGLES VACCINE 11/3/2016      Below and/or attached are the medications your provider expects you to take. Review all of your home medications and newly ordered medications with your provider and/or pharmacist. Follow medication instructions as directed by your provider and/or pharmacist. Please keep your medication list with you and share with your provider. Update the information when medications are discontinued, doses are changed, or new medications (including over-the-counter products) are added; and carry medication information at all times in the event of emergency situations     Allergies:  No Known Allergies          Medications  Valid as of: August 04, 2017 -  8:41 AM    Generic Name Brand Name Tablet Size Instructions for use    AmLODIPine Besylate (Tab) NORVASC 5 MG Take 5 mg by mouth every day.         "Blood Glucose Monitoring Suppl (Misc) Blood Glucose Monitoring Suppl SUPPLIES One Touch ultra blue glucometer strips for blood sugar checked twice a day, 90 days        Cholecalciferol (Tab) cholecalciferol 1000 UNIT Take 1,000 Units by mouth every day.        Finasteride (Tab) PROSCAR 5 MG Take 5 mg by mouth every day.        Furosemide (Tab) LASIX 40 MG Take 40 mg by mouth every day.        HydrALAZINE HCl (Tab) APRESOLINE 25 MG Take 25 mg by mouth 3 times a day.        Insulin NPH Isophane & Regular (Suspension) HUMULIN,NOVOLIN (70-30) 100 UNIT/ML Inject 18 Units as instructed 3 times a day before meals.        Insulin Syringe-Needle U-100 (Misc) INSULIN SYRINGE .5CC/31GX5/16\" 31G X 5/16\" 0.5 ML For insulin injections 3 times a day        Omeprazole (CAPSULE DELAYED RELEASE) PRILOSEC 20 MG Take 2 Caps by mouth every day.        Pioglitazone HCl (Tab) ACTOS 15 MG Take 1 Tab by mouth every day.        Potassium Chloride (Tab CR) KLOR-CON 10 MEQ Take 20 mEq by mouth every day.        Pravastatin Sodium (Tab) PRAVACHOL 20 MG Take 2 Tabs by mouth every day.        Valsartan-Hydrochlorothiazide (Tab) DIOVAN--12.5 MG Take 1 Tab by mouth every day.        Warfarin Sodium (Tab) COUMADIN 6 MG Take 1 Tab by mouth every day at 6 PM.        .                 Medicines prescribed today were sent to:     [facility] [address] [place], [place] [address] [address] [address]    Phone: [phone] Fax: [phone]    Open 24 Hours?: No      Medication refill instructions:       If your prescription bottle indicates you have medication refills left, it is not necessary to call your provider’s office. Please contact your pharmacy and they will refill your medication.    If your prescription bottle indicates you do not have any refills left, you may request refills at any time through one of the following ways: The online FetchBack system (except Urgent Care), by calling your provider’s " office, or by asking your pharmacy to contact your provider’s office with a refill request. Medication refills are processed only during regular business hours and may not be available until the next business day. Your provider may request additional information or to have a follow-up visit with you prior to refilling your medication.   *Please Note: Medication refills are assigned a new Rx number when refilled electronically. Your pharmacy may indicate that no refills were authorized even though a new prescription for the same medication is available at the pharmacy. Please request the medicine by name with the pharmacy before contacting your provider for a refill.        Your To Do List     Future Labs/Procedures Complete By Expires    COMP METABOLIC PANEL  As directed 8/4/2018    LIPID PROFILE  As directed 8/4/2018      Instructions    Follow up with me in 6 months with labs before visit    Continue care with Cardiology and Endocrinology          Sian[employer] Plan Access Code: [email or web address]  Expires: 8/13/2017  8:50 AM    Sian's Plan  A secure, online tool to manage your health information     [employer]’s Sian's Plan® is a secure, online tool that connects you to your personalized health information from the privacy of your home -- day or night - making it very easy for you to manage your healthcare. Once the activation process is completed, you can even access your medical information using the Sian's Plan mallorie, which is available for free in the Apple Mallorie store or Google Play store.     Sian's Plan provides the following levels of access (as shown below):   My Chart Features   [facility] Primary [facility] Doctor [facility]  Specialists [facility]  Urgent  Care Non-[facility]  Primary Care  Doctor   Email your healthcare team securely and privately 24/7 X X X    Manage appointments: schedule your next appointment; view details of past/upcoming appointments X      Request prescription refills. X      View recent personal medical records, including lab  and immunizations X X X X   View health record, including health history, allergies, medications X X X X   Read reports about your outpatient visits, procedures, consult and ER notes X X X X   See your discharge summary, which is a recap of your hospital and/or ER visit that includes your diagnosis, lab results, and care plan. X X       How to register for [employer]  1. Go to  [email or web address].  2. Click on the Sign Up Now box, which takes you to the New Member Sign Up page. You will need to provide the following information:  a. Enter your [employer] Access Code exactly as it appears at the top of this page. (You will not need to use this code after you’ve completed the sign-up process. If you do not sign up before the expiration date, you must request a new code.)   b. Enter your date of birth.   c. Enter your home email address.   d. Click Submit, and follow the next screen’s instructions.  3. Create a [employer] ID. This will be your [employer] login ID and cannot be changed, so think of one that is secure and easy to remember.  4. Create a [employer] password. You can change your password at any time.  5. Enter your Password Reset Question and Answer. This can be used at a later time if you forget your password.   6. Enter your e-mail address. This allows you to receive e-mail notifications when new information is available in [employer]  7. Click Sign Up. You can now view your health information.    For assistance activating your [employer] account, call [phone]

## 2017-08-04 NOTE — ASSESSMENT & PLAN NOTE
This is a chronic condition, stable and well-controlled on current regimen. His last hemoglobin A1c was down to 5.9%. He is followed by endocrinology. He does have upcoming appointment in October via telemedicine. He is to continue on his regimen as prescribed, continue to eat healthy and continue efforts towards weight loss.

## 2017-08-04 NOTE — ASSESSMENT & PLAN NOTE
This is a chronic condition, stable and well-controlled on current regimen including pravastatin 20 mg daily. His last lipid profile is as follows:    Component      Latest Ref Rng 11/15/2016           7:06 AM   Cholesterol,Tot      100 - 199 mg/dL 93 (L)   Triglycerides      0 - 149 mg/dL 48   HDL      >=40 mg/dL 33 (A)   LDL      <100 mg/dL 50   He is a continue on this medication as prescribed and call for refills as needed. He is also to continue on healthy low-fat, low-cholesterol diet. He will due for labs in 6 months before his follow-up appointment.

## 2017-08-04 NOTE — ASSESSMENT & PLAN NOTE
This is a chronic condition, stable and controlled with current regimen. He is also followed by the cardiology in the Coumadin clinic. He denies symptoms of atrial fibrillation at this time.

## 2017-08-04 NOTE — MR AVS SNAPSHOT
John MOISE Mary   2017 9:00 AM   Anticoagulation Visit   MRN: [id number]    Department:  [facility]   Dept Phone:  [phone]    Description:  Male : 1934   Provider:  [place] PHARMACIST           Allergies as of 2017     No Known Allergies      You were diagnosed with     Long term (current) use of anticoagulants   [V58.61.ICD-9-CM]       Atrial fibrillation, unspecified type (CMS-[facility])   [5659881]         Vital Signs     Blood Pressure Pulse Smoking Status             135/60 mmHg 113 Never Smoker          Basic Information     Date Of Birth Sex Race Ethnicity Preferred Language    1934 Male White Non- English      Your appointments     Aug 04, 2017  8:20 AM   Established Patient with AIYANA Easley   [facility] ([place])    [address] [place] [address] [phone]           You will be receiving a confirmation call a few days before your appointment from our automated call confirmation system.            Aug 04, 2017  9:00 AM   Anti-Coag Routine with [place] PHARMACIST   [facility] ([place])    [address] [place] [address] [phone]            Aug 25, 2017  8:30 AM   Anti-Coag Routine with [place] PHARMACIST   [facility] ([place]    [address] [place] [address] [phone]            Oct 10, 2017 10:40 AM   Telemedicine Clinic Established Pt with Sky Malloy M.D., TELEMED ENDOCRINOLOGY MD, TELEMEDICINE [facility] Endocrinology ([facility])    [address]  [facility] [id number] [phone]              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   5/3/2013 - Present    Hypertension I10   5/3/2013 - Present    Type 2 diabetes mellitus with complication (CMS-HCC) E11.8   5/3/2013 - Present    PVD (peripheral vascular  disease) (CMS-HCC) I73.9   5/3/2013 - Present    Atrial fibrillation (CMS-HCC) I48.91   5/3/2013 - Present    Esophageal reflux K21.9   5/3/2013 - Present    Dyslipidemia E78.5   5/3/2013 - Present    Atrial fibrillation (CMS-HCC) I48.91   9/25/2015 - Present    Amputation of foot, left, traumatic, 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, T87.9   11/9/2016 - Present    Diabetic complication (CMS-HCC) E11.8   1/5/2017 - Present    Bilateral hearing loss H91.93   2/10/2017 - Present    Long term (current) use of anticoagulants [Z79.01] Z79.01   3/17/2017 - Present      Health Maintenance        Date Due Completion Dates    RETINAL SCREENING 7/27/1952 ---    IMM DTaP/Tdap/Td Vaccine (1 - Tdap) 7/27/1953 ---    IMM INFLUENZA (1) 9/1/2017 8/19/2016, 11/1/2012    FASTING LIPID PROFILE 11/15/2017 11/15/2016, 10/12/2006, 7/6/2006, 4/5/2006    URINE ACR / MICROALBUMIN 11/15/2017 11/15/2016, 1/3/2006    A1C SCREENING 12/29/2017 6/29/2017, 3/25/2017, 11/15/2016, 10/12/2006, 7/6/2006, 4/5/2006, 1/3/2006    DIABETES MONOFILAMENT / LE EXAM 2/10/2018 2/10/2017    SERUM CREATININE 2/11/2018 2/11/2017, 5/4/2013, 4/5/2006            Results     POCT Protime      Component    INR    1.6    Comment:     ic valid [id number] 160 exp 03/2018                        Current Immunizations     13-VALENT PCV PREVNAR 4/26/2016    Influenza TIV (IM) 11/1/2012    Influenza Vaccine Adult HD 8/19/2016    Pneumococcal polysaccharide vaccine (PPSV-23) 1/19/2011    SHINGLES VACCINE 11/3/2016      Below and/or attached are the medications your provider expects you to take. Review all of your home medications and newly ordered medications with your provider and/or pharmacist. Follow medication instructions as directed by your provider and/or pharmacist. Please keep your medication list with you and share with your provider. Update the information when medications are discontinued, doses are changed, or new medications (including over-the-counter products) are  "added; and carry medication information at all times in the event of emergency situations     Allergies:  No Known Allergies          Medications  Valid as of: August 04, 2017 -  8:18 AM    Generic Name Brand Name Tablet Size Instructions for use    AmLODIPine Besylate (Tab) NORVASC 5 MG Take 5 mg by mouth every day.        Blood Glucose Monitoring Suppl (Misc) Blood Glucose Monitoring Suppl SUPPLIES One Touch ultra blue glucometer strips for blood sugar checked twice a day, 90 days        Cholecalciferol (Tab) cholecalciferol 1000 UNIT Take 1,000 Units by mouth every day.        Finasteride (Tab) PROSCAR 5 MG Take 5 mg by mouth every day.        Furosemide (Tab) LASIX 40 MG Take 40 mg by mouth every day.        HydrALAZINE HCl (Tab) APRESOLINE 25 MG Take 25 mg by mouth 3 times a day.        Insulin NPH Isophane & Regular (Suspension) HUMULIN,NOVOLIN (70-30) 100 UNIT/ML Inject 18 Units as instructed 3 times a day before meals.        Insulin Syringe-Needle U-100 (Misc) INSULIN SYRINGE .5CC/31GX5/16\" 31G X 5/16\" 0.5 ML For insulin injections 3 times a day        Omeprazole (CAPSULE DELAYED RELEASE) PRILOSEC 20 MG Take 2 Caps by mouth every day.        Pioglitazone HCl (Tab) ACTOS 15 MG Take 1 Tab by mouth every day.        Potassium Chloride (Tab CR) KLOR-CON 10 MEQ Take 20 mEq by mouth every day.        Pravastatin Sodium (Tab) PRAVACHOL 20 MG Take 2 Tabs by mouth every day.        Valsartan-Hydrochlorothiazide (Tab) DIOVAN--12.5 MG Take 0.5 Tabs by mouth every day.        Valsartan-Hydrochlorothiazide (Tab) DIOVAN--12.5 MG Take 1 Tab by mouth every day.        Warfarin Sodium (Tab) COUMADIN 6 MG Take 1 Tab by mouth every day at 6 PM.        .                 Medicines prescribed today were sent to:     [facility] - [address] [facility]    [address] [facility] [address]    Phone: [phone] Fax: [phone]    Open 24 Hours?: No      Medication refill " instructions:       If your prescription bottle indicates you have medication refills left, it is not necessary to call your provider’s office. Please contact your pharmacy and they will refill your medication.    If your prescription bottle indicates you do not have any refills left, you may request refills at any time through one of the following ways: The online Ninja Blocks system (except Urgent Care), by calling your provider’s office, or by asking your pharmacy to contact your provider’s office with a refill request. Medication refills are processed only during regular business hours and may not be available until the next business day. Your provider may request additional information or to have a follow-up visit with you prior to refilling your medication.   *Please Note: Medication refills are assigned a new Rx number when refilled electronically. Your pharmacy may indicate that no refills were authorized even though a new prescription for the same medication is available at the pharmacy. Please request the medicine by name with the pharmacy before contacting your provider for a refill.        Warfarin Dosing Calendar   August 2017 Details    Sun Mon Tue Wed Thu Fri Sat       1               2               3               4   1.6   6 mg   See details      5      3 mg           6      3 mg         7      6 mg         8      3 mg         9      6 mg         10      3 mg         11      6 mg         12      3 mg           13      3 mg         14      6 mg         15      3 mg         16      6 mg         17      3 mg         18      6 mg         19      3 mg           20      3 mg         21      6 mg         22      3 mg         23      6 mg         24      3 mg         25      6 mg         26                 27               28               29               30               31                  Date Details   08/04 This INR check   INR: 1.6   ic valid [id number] 160 exp 03/2018       Date of next INR:  8/25/2017          How to take your warfarin dose     To take:  3 mg Take 0.5 of a 6 mg tablet.    To take:  6 mg Take 1 of the 6 mg tablets.              [employer] Access Code: [email or web address]  Expires: 8/13/2017  8:50 AM    [employer]  A secure, online tool to manage your health information     [employer]® is a secure, online tool that connects you to your personalized health information from the privacy of your home -- day or night - making it very easy for you to manage your healthcare. Once the activation process is completed, you can even access your medical information using the Vers[employer] mallorie, which is available for free in the Apple Mallorie store or Google Play store.     [employer] provides the following levels of access (as shown below):   My Chart Features   [facility] Doctor [facility]  Specialists [facility]  Urgent  Care Non-[facility]  Primary Care  Doctor   Email your healthcare team securely and privately 24/7 X X X    Manage appointments: schedule your next appointment; view details of past/upcoming appointments X      Request prescription refills. X      View recent personal medical records, including lab and immunizations X X X X   View health record, including health history, allergies, medications X X X X   Read reports about your outpatient visits, procedures, consult and ER notes X X X X   See your discharge summary, which is a recap of your hospital and/or ER visit that includes your diagnosis, lab results, and care plan. X X       How to register for [employer]:  1. Go to  [email or web address].  2. Click on the Sign Up Now box, which takes you to the New Member Sign Up page. You will need to provide the following information:  a. Enter your [employer] Access Code exactly as it appears at the top of this page. (You will not need to use this code after you’ve completed the sign-up process. If you do not sign up before the expiration date, you must request a new code.)   b. Enter your date of birth.    c. Enter your home email address.   d. Click Submit, and follow the next screen’s instructions.  3. Create a [employer] ID. This will be your [employer] login ID and cannot be changed, so think of one that is secure and easy to remember.  4. Create a [employer] password. You can change your password at any time.  5. Enter your Password Reset Question and Answer. This can be used at a later time if you forget your password.   6. Enter your e-mail address. This allows you to receive e-mail notifications when new information is available in [employer].  7. Click Sign Up. You can now view your health information.    For assistance activating your [employer] account, call [phone]

## 2017-08-04 NOTE — ASSESSMENT & PLAN NOTE
This is a chronic condition, uncontrolled. Patient weight is 224, BMI 32.14. He does understand the risks associated with his weight. He has been trying to decrease calories and decreased carbs but continues to struggle with this. It is difficult for him to exercise with his peripheral vascular disease, and amputation of left foot. We will reassess again at his follow-up appointment.

## 2017-08-04 NOTE — PROGRESS NOTES
Chief Complaint   Patient presents with   • Diabetes     fv Endo         This is a 83 y.o.male patient that presents today with the following: Follow-up visit, discuss acute and chronic conditions     Hypertension  This is a chronic condition, stable and well-controlled on current regimen. Patient's blood pressure is 122/5 and he denies symptoms of hypertension. He tolerates his medications well with no significant bothersome side effects. He does not need refills at this time, but didn't call as needed. He will be due for labs again in 6 months, he is having these done before he follows up with me at that time.    Dyslipidemia  This is a chronic condition, stable and well-controlled on current regimen including pravastatin 20 mg daily. His last lipid profile is as follows:    Component      Latest Ref Rng 11/15/2016           7:06 AM   Cholesterol,Tot      100 - 199 mg/dL 93 (L)   Triglycerides      0 - 149 mg/dL 48   HDL      >=40 mg/dL 33 (A)   LDL      <100 mg/dL 50   He is a continue on this medication as prescribed and call for refills as needed. He is also to continue on healthy low-fat, low-cholesterol diet. He will due for labs in 6 months before his follow-up appointment.      Obesity (BMI 30-39.9)  This is a chronic condition, uncontrolled. Patient weight is 224, BMI 32.14. He does understand the risks associated with his weight. He has been trying to decrease calories and decreased carbs but continues to struggle with this. It is difficult for him to exercise with his peripheral vascular disease, and amputation of left foot. We will reassess again at his follow-up appointment.    Type 2 diabetes mellitus with complication (CMS-[facility])  This is a chronic condition, stable and well-controlled on current regimen. His last hemoglobin A1c was down to 5.9%. He is followed by endocrinology. He does have upcoming appointment in October via telemedicine. He is to continue on his regimen as prescribed, continue to  "eat healthy and continue efforts towards weight loss.    Atrial fibrillation (CMS-HCC)  This is a chronic condition, stable and controlled with current regimen. He is also followed by the cardiology in the Coumadin clinic. He denies symptoms of atrial fibrillation at this time.      Anticoagulation Visit on 07/14/2017   Component Date Value   • INR 07/14/2017 1.5          clinical course has been stable    Past Medical History   Diagnosis Date   • Hypertension    • Arrhythmia      ATRIAL FIB   • Dental disorder    • Urinary bladder disorder      BPH   • Glaucoma      BLIND RIGHT EYE   • Heart murmur    • Peripheral vascular disease (CMS-HCC)    • Wound of left leg      COVERED W/ DRESSING OA TO PACU       Past Surgical History   Procedure Laterality Date   • Other neurological surg       BKA LEFT LEG   • Brachial embolectomy  5/3/2013     Performed by Ananth Cook M.D. at SURGERY [facility]       No family history on file.    Review of patient's allergies indicates no known allergies.    Current Outpatient Prescriptions Ordered in [place]   Medication Sig Dispense Refill   • Blood Glucose Monitoring Suppl SUPPLIES Misc One Touch ultra blue glucometer strips for blood sugar checked twice a day, 90 days 200 Each 3   • valsartan-hydrochlorothiazide (DIOVAN HCT) 320-12.5 MG per tablet Take 1 Tab by mouth every day. 90 Tab 0   • pioglitazone (ACTOS) 15 MG Tab Take 1 Tab by mouth every day. 30 Tab 4   • pravastatin (PRAVACHOL) 20 MG Tab Take 2 Tabs by mouth every day. 180 Tab 2   • omeprazole (PRILOSEC) 20 MG delayed-release capsule Take 2 Caps by mouth every day. 60 Cap 3   • warfarin (COUMADIN) 6 MG Tab Take 1 Tab by mouth every day at 6 PM. 90 Tab 1   • insulin 70/30 (NOVOLIN 70/30 RELION) (70-30) 100 UNIT/ML Suspension Inject 18 Units as instructed 3 times a day before meals. 20 mL 6   • Insulin Syringe-Needle U-100 (INSULIN SYRINGE .5CC/31GX5/16\") 31G X 5/16\" 0.5 ML Misc For insulin injections 3 times a day 300 " "Each 2   • furosemide (LASIX) 40 MG Tab Take 40 mg by mouth every day.     • hydrALAZINE (APRESOLINE) 25 MG TABS Take 25 mg by mouth 3 times a day.     • potassium chloride CR (K-TABS) 10 MEQ tablet Take 20 mEq by mouth every day.     • amlodipine (NORVASC) 5 MG TABS Take 5 mg by mouth every day.     • finasteride (PROSCAR) 5 MG TABS Take 5 mg by mouth every day.     • vitamin D (CHOLECALCIFEROL) 1000 UNIT TABS Take 1,000 Units by mouth every day.       No current [facility]-ordered facility-administered medications on file.       Constitutional ROS: No unexpected change in weight, No weakness, No unexplained fevers, sweats, or chills  Pulmonary ROS: No chronic cough, sputum, or hemoptysis, No shortness of breath, No recent change in breathing  Cardiovascular ROS: Positive per history of present illness  Gastrointestinal ROS: No abdominal pain, No nausea, vomiting, diarrhea, or constipation, no blood in stool  Musculoskeletal/Extremities ROS: No clubbing, No peripheral edema, No pain, redness or swelling on the joints  Neurologic ROS: Normal development, No seizures, No weakness  Endocrine ROS: Positive per history of present illness    Physical exam:  /52 mmHg  Pulse 90  Temp(Src) 37.1 °C (98.8 °F)  Resp 16  Ht 1.778 m (5' 10\")  Wt 101.606 kg (224 lb)  BMI 32.14 kg/m2  SpO2 92%  General Appearance: Elderly male, alert, no distress, obese, well-groomed  Skin: Skin color, texture, turgor normal. No rashes or lesions.  Lungs: negative findings: normal respiratory rate and rhythm, lungs clear to auscultation  Heart: heart sounds distant  Abdomen: Abdomen soft, non-tender. BS normal. No masses,  No organomegaly  Musculoskeletal: negative findings: no evidence of joint instability, strength normal, no evidence of muscle atrophy  Neurologic: intact, oriented, mood appropriate, judgment intact. Cranial nerves II through XII grossly intact    Medical decision making/discussion: Patient here for follow-up visit and to " discuss his acute and chronic conditions. He is to continue care per endocrinology, cardiology in the Coumadin clinic. He is to take all of his medications as prescribed and call for refills as needed. He is to follow up with me in 6 months with labs done before visit. We will request outside records from cardiology. He is to continue with healthy diet, regular physical activity when able and continued efforts towards weight loss.    John MOISE was seen today for diabetes.    Diagnoses and all orders for this visit:    Essential hypertension  -     COMP METABOLIC PANEL; Future  -     LIPID PROFILE; Future    Dyslipidemia  -     COMP METABOLIC PANEL; Future  -     LIPID PROFILE; Future    Obesity (BMI 30-39.9)  -     Patient identified as having weight management issue.  Appropriate orders and counseling given.    Type 2 diabetes mellitus with complication, with long-term current use of insulin (CMS-[facility])    Atrial fibrillation, unspecified type (CMS-HCC)    Long term (current) use of anticoagulants [Z79.01]    Other orders  -     Obtain Results:: Other (see comment); Obtain Results From:: Other (see comment)          Please note that this dictation was created using voice recognition software. I have made every reasonable attempt to correct obvious errors, but I expect that there are errors of grammar and possibly content that I did not discover before finalizing the note.

## 2017-08-25 ENCOUNTER — ANTICOAGULATION VISIT (OUTPATIENT)
Dept: MEDICAL GROUP | Facility: PHYSICIAN GROUP | Age: 82
End: 2017-08-25
Payer: MEDICARE

## 2017-08-25 VITALS — SYSTOLIC BLOOD PRESSURE: 136 MMHG | DIASTOLIC BLOOD PRESSURE: 68 MMHG | HEART RATE: 101 BPM

## 2017-08-25 DIAGNOSIS — I48.91 ATRIAL FIBRILLATION, UNSPECIFIED TYPE (HCC): ICD-10-CM

## 2017-08-25 DIAGNOSIS — Z79.01 LONG TERM (CURRENT) USE OF ANTICOAGULANTS: ICD-10-CM

## 2017-08-25 LAB — INR PPP: 1.7 (ref 2–3.5)

## 2017-08-25 PROCEDURE — 85610 PROTHROMBIN TIME: CPT | Performed by: PHYSICIAN ASSISTANT

## 2017-08-25 NOTE — MR AVS SNAPSHOT
John SUMA Mary   2017 8:30 AM   Anticoagulation Visit   MRN: [id number]    Department:  [facility]   Dept Phone:  [phone]    Description:  Male : 1934   Provider:  FERNLEY PHARMACIST           Allergies as of 2017     No Known Allergies      You were diagnosed with     Long term (current) use of anticoagulants   [V58.61.ICD-9-CM]       Atrial fibrillation, unspecified type (CMS-[facility])   [2795952]         Vital Signs     Blood Pressure Pulse Smoking Status             136/68 mmHg 101 Never Smoker          Basic Information     Date Of Birth Sex Race Ethnicity Preferred Language    1934 Male White Non- English      Your appointments     Aug 25, 2017  8:30 AM   Anti-Coag Routine with LILIANA PHARMACIST   [facility]    [address] [place] [id number] [phone]            Sep 15, 2017  8:30 AM   Anti-Coag Routine with KM PHARMACIST   [facility]    [address] [place] [id number] [phone]            Oct 10, 2017 10:40 AM   Telemedicine Clinic Established Pt with Sky Malloy M.D., TELEMED ENDOCRINOLOGY MD, TELEMEDICINE [facility] [address], [address]  [place] [address] [phone]            2018  9:20 AM   Established Patient with AIYANA Easley   [facility]    [address] [place] [address] [phone]           You will be receiving a confirmation call a few days before your appointment from our automated call confirmation system.              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   5/3/2013 - Present    Hypertension I10   5/3/2013 - Present    Type 2 diabetes mellitus with complication (CMS-HCC) E11.8   5/3/2013 - Present    PVD (peripheral vascular  disease) (CMS-HCC) I73.9   5/3/2013 - Present    Atrial fibrillation (CMS-HCC) I48.91   5/3/2013 - Present    Esophageal reflux K21.9   5/3/2013 - Present    Dyslipidemia E78.5   5/3/2013 - Present    Atrial fibrillation (CMS-HCC) I48.91   9/25/2015 - Present    Amputation of foot, left, traumatic, 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, T87.9   11/9/2016 - Present    Diabetic complication (CMS-HCC) E11.8   1/5/2017 - Present    Bilateral hearing loss H91.93   2/10/2017 - Present    Long term (current) use of anticoagulants [Z79.01] Z79.01   3/17/2017 - Present    Obesity (BMI 30-39.9) E66.9   8/4/2017 - Present      Health Maintenance        Date Due Completion Dates    RETINAL SCREENING 7/27/1952 ---    IMM DTaP/Tdap/Td Vaccine (1 - Tdap) 7/27/1953 ---    IMM INFLUENZA (1) 9/1/2017 8/19/2016, 11/1/2012    FASTING LIPID PROFILE 11/15/2017 11/15/2016, 10/12/2006, 7/6/2006, 4/5/2006    URINE ACR / MICROALBUMIN 11/15/2017 11/15/2016, 1/3/2006    A1C SCREENING 12/29/2017 6/29/2017, 3/25/2017, 11/15/2016, 10/12/2006, 7/6/2006, 4/5/2006, 1/3/2006    DIABETES MONOFILAMENT / LE EXAM 2/10/2018 2/10/2017    SERUM CREATININE 2/11/2018 2/11/2017, 5/4/2013, 4/5/2006            Results     POCT Protime      Component    INR    1.7    Comment:     ic valid [id number] 160 exp 05/2018                        Current Immunizations     13-VALENT PCV PREVNAR 4/26/2016    Influenza TIV (IM) 11/1/2012    Influenza Vaccine Adult HD 8/19/2016    Pneumococcal polysaccharide vaccine (PPSV-23) 1/19/2011    SHINGLES VACCINE 11/3/2016      Below and/or attached are the medications your provider expects you to take. Review all of your home medications and newly ordered medications with your provider and/or pharmacist. Follow medication instructions as directed by your provider and/or pharmacist. Please keep your medication list with you and share with your provider. Update the information when medications are discontinued, doses are changed, or new  "medications (including over-the-counter products) are added; and carry medication information at all times in the event of emergency situations     Allergies:  No Known Allergies          Medications  Valid as of: August 25, 2017 -  8:26 AM    Generic Name Brand Name Tablet Size Instructions for use    AmLODIPine Besylate (Tab) NORVASC 5 MG Take 5 mg by mouth every day.        Blood Glucose Monitoring Suppl (Misc) Blood Glucose Monitoring Suppl Supplies One Touch ultra blue glucometer strips for blood sugar checked twice a day, 90 days        Cholecalciferol (Tab) cholecalciferol 1000 UNIT Take 1,000 Units by mouth every day.        Finasteride (Tab) PROSCAR 5 MG Take 5 mg by mouth every day.        Furosemide (Tab) LASIX 40 MG Take 40 mg by mouth every day.        HydrALAZINE HCl (Tab) APRESOLINE 25 MG Take 25 mg by mouth 3 times a day.        Insulin NPH Isophane & Regular (Suspension) HUMULIN,NOVOLIN (70-30) 100 UNIT/ML Inject 18 Units as instructed 3 times a day before meals.        Insulin Syringe-Needle U-100 (Misc) INSULIN SYRINGE .5CC/31GX5/16\" 31G X 5/16\" 0.5 ML For insulin injections 3 times a day        Omeprazole (CAPSULE DELAYED RELEASE) PRILOSEC 20 MG Take 2 Caps by mouth every day.        Pioglitazone HCl (Tab) ACTOS 15 MG Take 1 Tab by mouth every day.        Potassium Chloride (Tab CR) KLOR-CON 10 MEQ Take 20 mEq by mouth every day.        Pravastatin Sodium (Tab) PRAVACHOL 20 MG Take 2 Tabs by mouth every day.        Valsartan-Hydrochlorothiazide (Tab) DIOVAN--12.5 MG Take 1 Tab by mouth every day.        Warfarin Sodium (Tab) COUMADIN 6 MG Take 1 Tab by mouth every day at 6 PM.        .                 Medicines prescribed today were sent to:     [facility] [address] [place] [address] [address] [facility]    [address] [facility] [address]    Phone: [phone] Fax: [phone]    Open 24 Hours?: No      Medication refill instructions:       If your prescription bottle " indicates you have medication refills left, it is not necessary to call your provider’s office. Please contact your pharmacy and they will refill your medication.    If your prescription bottle indicates you do not have any refills left, you may request refills at any time through one of the following ways: The online [employer] system (except Urgent Care), by calling your provider’s office, or by asking your pharmacy to contact your provider’s office with a refill request. Medication refills are processed only during regular business hours and may not be available until the next business day. Your provider may request additional information or to have a follow-up visit with you prior to refilling your medication.   *Please Note: Medication refills are assigned a new Rx number when refilled electronically. Your pharmacy may indicate that no refills were authorized even though a new prescription for the same medication is available at the pharmacy. Please request the medicine by name with the pharmacy before contacting your provider for a refill.        Warfarin Dosing Calendar   August 2017 Details    Sun Mon Tue Wed Thu Fri Sat       1               2               3               4               5                 6               7               8               9               10               11               12                 13               14               15               16               17               18               19                 20               21               22               23               24               25   1.7   6 mg   See details      26      6 mg           27      3 mg         28      6 mg         29      3 mg         30      6 mg         31      3 mg            Date Details   08/25 This INR check   INR: 1.7   ic valid [id number] 160 exp 05/2018      Date of next INR: No date specified         How to take your warfarin dose     To take:  3 mg Take 0.5 of a 6 mg tablet.    To take:  6 mg  Take 1 of the 6 mg tablets.              Buzz Referrals Access Code: [email or web address]  Expires: 9/24/2017  8:26 AM    Buzz Referrals  A secure, online tool to manage your health information     [employer]’s Buzz Referrals® is a secure, online tool that connects you to your personalized health information from the privacy of your home -- day or night - making it very easy for you to manage your healthcare. Once the activation process is completed, you can even access your medical information using the Buzz Referrals mallorie, which is available for free in the Apple Mallorie store or Google Play store.     Buzz Referrals provides the following levels of access (as shown below):   My Chart Features   [facility] Primary Care Doctor [facility]  Specialists [facility]  Urgent  Care Non-[facility]  Primary Care  Doctor   Email your healthcare team securely and privately 24/7 X X X    Manage appointments: schedule your next appointment; view details of past/upcoming appointments X      Request prescription refills. X      View recent personal medical records, including lab and immunizations X X X X   View health record, including health history, allergies, medications X X X X   Read reports about your outpatient visits, procedures, consult and ER notes X X X X   See your discharge summary, which is a recap of your hospital and/or ER visit that includes your diagnosis, lab results, and care plan. X X       How to register for Buzz Referrals:  1. Go to  [email or web address].  2. Click on the Sign Up Now box, which takes you to the New Member Sign Up page. You will need to provide the following information:  a. Enter your Buzz Referrals Access Code exactly as it appears at the top of this page. (You will not need to use this code after you’ve completed the sign-up process. If you do not sign up before the expiration date, you must request a new code.)   b. Enter your date of birth.   c. Enter your home email address.   d. Click Submit, and follow the next screen’s instructions.  3. Create  a MyChart ID. This will be your [employer] login ID and cannot be changed, so think of one that is secure and easy to remember.  4. Create a [employer] password. You can change your password at any time.  5. Enter your Password Reset Question and Answer. This can be used at a later time if you forget your password.   6. Enter your e-mail address. This allows you to receive e-mail notifications when new information is available in [employer].  7. Click Sign Up. You can now view your health information.    For assistance activating your [employer] account, call [phone]

## 2017-09-11 ENCOUNTER — TELEPHONE (OUTPATIENT)
Dept: ENDOCRINOLOGY | Facility: MEDICAL CENTER | Age: 82
End: 2017-09-11

## 2017-09-11 RX ORDER — PRAVASTATIN SODIUM 40 MG
40 TABLET ORAL
Qty: 90 TAB | Refills: 3 | Status: SHIPPED | OUTPATIENT
Start: 2017-09-11

## 2017-09-11 NOTE — TELEPHONE ENCOUNTER
Kindly change Rx pravsatatin 20 mg (BID) to 40 mg (QD)    Rx will be send to [facility]    Thank you  Leah

## 2017-09-29 ENCOUNTER — ANTICOAGULATION VISIT (OUTPATIENT)
Dept: MEDICAL GROUP | Facility: PHYSICIAN GROUP | Age: 82
End: 2017-09-29
Payer: MEDICARE

## 2017-09-29 VITALS — DIASTOLIC BLOOD PRESSURE: 79 MMHG | SYSTOLIC BLOOD PRESSURE: 161 MMHG | HEART RATE: 48 BPM

## 2017-09-29 DIAGNOSIS — Z79.01 LONG TERM (CURRENT) USE OF ANTICOAGULANTS: ICD-10-CM

## 2017-09-29 DIAGNOSIS — I48.91 ATRIAL FIBRILLATION, UNSPECIFIED TYPE (HCC): ICD-10-CM

## 2017-09-29 LAB — INR PPP: 2.4 (ref 2–3.5)

## 2017-09-29 PROCEDURE — 85610 PROTHROMBIN TIME: CPT | Performed by: PHYSICIAN ASSISTANT

## 2017-09-29 NOTE — PROGRESS NOTES
Anticoagulation Summary  As of 9/29/2017    INR goal:   2.0-3.0   TTR:   43.0 % (2.3 y)   Today's INR:   2.4   Maintenance plan:   3 mg (6 mg x 0.5) on Sun, Tue, Thu; 6 mg (6 mg x 1) all other days   Weekly total:   33 mg   Plan last modified:   Keith Collado PharmD (8/25/2017)   Next INR check:   11/10/2017   Target end date:   Indefinite    Indications    Atrial fibrillation (CMS-HCC) [I48.91]  Long term (current) use of anticoagulants [Z79.01] [Z79.01]             Anticoagulation Episode Summary     INR check location:   [facility]    Preferred lab:       Send INR reminders to:       Comments:   TTR is 45.05%, consider alternative      Anticoagulation Care Providers     Provider Role Specialty Phone number    Dontae Ng D.O. Referring Cardiology [phone]    [facility]   [phone]    LONNY EasleyRSUHAIL  Family Medicine [phone]        Anticoagulation Patient Findings      HPI:  John Hernandez seen in clinic today, on anticoagulation therapy with warfarin for afib  Changes to current medical/health status since last appt:   Denies signs/symptoms of bleeding and/or thrombosis since the last appt.    Denies any interval changes to diet  Denies any interval changes to medications since last appt.   Denies any complications or cost restrictions with current therapy.   BP recorded in vitals.    A/P   INR  therapeutic.   Continue weekly warfarin dose as noted      Follow up appointment in 6 week(s).     Keith Collado PharmD

## 2017-09-30 ENCOUNTER — HOSPITAL ENCOUNTER (OUTPATIENT)
Dept: LAB | Facility: MEDICAL CENTER | Age: 82
End: 2017-09-30
Attending: NURSE PRACTITIONER
Payer: MEDICARE

## 2017-09-30 DIAGNOSIS — Z79.4 TYPE 2 DIABETES MELLITUS WITH COMPLICATION, WITH LONG-TERM CURRENT USE OF INSULIN (HCC): ICD-10-CM

## 2017-09-30 DIAGNOSIS — E11.8 TYPE 2 DIABETES MELLITUS WITH COMPLICATION, WITH LONG-TERM CURRENT USE OF INSULIN (HCC): ICD-10-CM

## 2017-09-30 DIAGNOSIS — E78.5 DYSLIPIDEMIA: ICD-10-CM

## 2017-09-30 DIAGNOSIS — I10 ESSENTIAL HYPERTENSION: ICD-10-CM

## 2017-09-30 LAB
ALBUMIN SERPL BCP-MCNC: 4 G/DL (ref 3.2–4.9)
ALBUMIN/GLOB SERPL: 1.3 G/DL
ALP SERPL-CCNC: 92 U/L (ref 30–99)
ALT SERPL-CCNC: 12 U/L (ref 2–50)
ANION GAP SERPL CALC-SCNC: 7 MMOL/L (ref 0–11.9)
AST SERPL-CCNC: 22 U/L (ref 12–45)
BILIRUB SERPL-MCNC: 1.4 MG/DL (ref 0.1–1.5)
BUN SERPL-MCNC: 28 MG/DL (ref 8–22)
CALCIUM SERPL-MCNC: 9.2 MG/DL (ref 8.5–10.5)
CHLORIDE SERPL-SCNC: 107 MMOL/L (ref 96–112)
CHOLEST SERPL-MCNC: 97 MG/DL (ref 100–199)
CO2 SERPL-SCNC: 25 MMOL/L (ref 20–33)
CREAT SERPL-MCNC: 1.01 MG/DL (ref 0.5–1.4)
GFR SERPL CREATININE-BSD FRML MDRD: >60 ML/MIN/1.73 M 2
GLOBULIN SER CALC-MCNC: 3.1 G/DL (ref 1.9–3.5)
GLUCOSE SERPL-MCNC: 48 MG/DL (ref 65–99)
HDLC SERPL-MCNC: 33 MG/DL
LDLC SERPL CALC-MCNC: 56 MG/DL
POTASSIUM SERPL-SCNC: 4.4 MMOL/L (ref 3.6–5.5)
PROT SERPL-MCNC: 7.1 G/DL (ref 6–8.2)
SODIUM SERPL-SCNC: 139 MMOL/L (ref 135–145)
TRIGL SERPL-MCNC: 42 MG/DL (ref 0–149)

## 2017-09-30 PROCEDURE — 80061 LIPID PANEL: CPT

## 2017-09-30 PROCEDURE — 36415 COLL VENOUS BLD VENIPUNCTURE: CPT

## 2017-09-30 PROCEDURE — 80053 COMPREHEN METABOLIC PANEL: CPT

## 2017-10-03 RX ORDER — FINASTERIDE 5 MG/1
TABLET, FILM COATED ORAL
Qty: 90 TAB | Refills: 1 | Status: SHIPPED | OUTPATIENT
Start: 2017-10-03 | End: 2017-10-09 | Stop reason: CLARIF

## 2017-10-05 ENCOUNTER — TELEPHONE (OUTPATIENT)
Dept: MEDICAL GROUP | Facility: PHYSICIAN GROUP | Age: 82
End: 2017-10-05

## 2017-10-05 NOTE — TELEPHONE ENCOUNTER
1. Caller Name: John                                          Call Back Number: [phone] (home)       Patient approves a detailed voicemail message: N\A    John asking for refill of Proscar. Informed John RX approved on Wednesday, I will contact Rajan Hirsch. John agreed with this plan.

## 2017-10-09 ENCOUNTER — TELEPHONE (OUTPATIENT)
Dept: MEDICAL GROUP | Facility: PHYSICIAN GROUP | Age: 82
End: 2017-10-09

## 2017-10-09 DIAGNOSIS — I10 ESSENTIAL HYPERTENSION: ICD-10-CM

## 2017-10-09 RX ORDER — FINASTERIDE 5 MG/1
5 TABLET, FILM COATED ORAL DAILY
Qty: 90 TAB | Refills: 1 | Status: SHIPPED | OUTPATIENT
Start: 2017-10-09 | End: 2018-03-14 | Stop reason: SDUPTHER

## 2017-10-10 ENCOUNTER — TELEMEDICINE2 (OUTPATIENT)
Dept: ENDOCRINOLOGY | Facility: MEDICAL CENTER | Age: 82
End: 2017-10-10
Payer: MEDICARE

## 2017-10-10 ENCOUNTER — HOSPITAL ENCOUNTER (OUTPATIENT)
Dept: LAB | Facility: MEDICAL CENTER | Age: 82
End: 2017-10-10
Attending: INTERNAL MEDICINE
Payer: MEDICARE

## 2017-10-10 VITALS
HEIGHT: 70 IN | WEIGHT: 220 LBS | OXYGEN SATURATION: 98 % | SYSTOLIC BLOOD PRESSURE: 120 MMHG | DIASTOLIC BLOOD PRESSURE: 60 MMHG | BODY MASS INDEX: 31.5 KG/M2 | HEART RATE: 60 BPM

## 2017-10-10 DIAGNOSIS — E11.9 TYPE 2 DIABETES MELLITUS WITHOUT COMPLICATION, WITH LONG-TERM CURRENT USE OF INSULIN (HCC): ICD-10-CM

## 2017-10-10 DIAGNOSIS — I10 ESSENTIAL HYPERTENSION: ICD-10-CM

## 2017-10-10 DIAGNOSIS — Z79.4 TYPE 2 DIABETES MELLITUS WITHOUT COMPLICATION, WITH LONG-TERM CURRENT USE OF INSULIN (HCC): ICD-10-CM

## 2017-10-10 DIAGNOSIS — E78.2 MIXED HYPERLIPIDEMIA: ICD-10-CM

## 2017-10-10 LAB
EST. AVERAGE GLUCOSE BLD GHB EST-MCNC: 117 MG/DL
HBA1C MFR BLD: 5.7 % (ref 0–5.6)

## 2017-10-10 PROCEDURE — 36415 COLL VENOUS BLD VENIPUNCTURE: CPT | Mod: GA

## 2017-10-10 PROCEDURE — 99214 OFFICE O/P EST MOD 30 MIN: CPT | Mod: GT | Performed by: INTERNAL MEDICINE

## 2017-10-10 PROCEDURE — 83036 HEMOGLOBIN GLYCOSYLATED A1C: CPT | Mod: GA

## 2017-10-10 NOTE — PROGRESS NOTES
"Endocrinology Clinic Progress Note (Telemedicine visit)  Primary care physician: AIYANA Easley    CC: Routine follow-up for diabetes    HPI:  John Hernandez is a 82 y.o. old patient with history of type 2 diabetes here for follow-up.    Type 2 diabetes: He is currently on Novolin 70/30, he takes 20 units with breakfast and 20 units with dinner. Reports compliance medications. He checks blood sugars twice a day. He has occasional hypoglycemia within a few hours of dinner, and sometimes a few hours before dinner.    Hypertension: Blood pressure is well controlled. He is on ARB.    Hyperlipidemia: LDL 56. He is currently on pravastatin.    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   • Hypertension 05/03/2013   • Type 2 diabetes mellitus with complication (CMS-HCC) 05/03/2013   • PVD (peripheral vascular disease) (CMS-HCC) 05/03/2013   • Atrial fibrillation (CMS-HCC) 05/03/2013   • Esophageal reflux 05/03/2013   • Dyslipidemia 05/03/2013     Physical Examination:  Vital signs: /60   Pulse 60   Ht 1.778 m (5' 10\")   Wt 99.8 kg (220 lb)   SpO2 98%   BMI 31.57 kg/m²   General: No apparent distress, cooperative  Eyes: No obvious exophthalmos  ENMT: Normal on external inspection of nose, lips  Neck: On visual inspection no obvious mass seen  Resp: Normal effort  Neuro: Alert and oriented  Skin: No rash on visible skin  Psych: Normal mood and affect    Assessment and Plan:    Type 2 diabetes mellitus without " complication, with long-term current use of insulin (CMS-[facility])  · Hemoglobin A1c in June was 5.9%   · Goal hemoglobin A1c less than 8%  · Advised to continue checking blood sugars twice a day  · Due to occasional hypoglycemia I advised him to lower the dose of Novolin 70/30 to 18 units with breakfast and 18 units with dinner  · Repeat A1c  -     HEMOGLOBIN A1C; Future    Essential hypertension  · Blood pressure is well controlled  · Continue ARB    Mixed hyperlipidemia  · LDL 56  · Continue pravastatin    Return in about 3 months (around 1/10/2018).    Thank you for allowing me to participate in the care of this patient.    This visit was conducted utilizing secure and encrypted videoconferencing equipment with the assistance of a trained tele-presenter at the originating site.    Sky Malloy M.D.      CC:   Ria Alford, [email or web address].    This note was created using voice recognition software (Dragon). The accuracy of the dictation is limited by the abilities of the software. I have reviewed the note prior to signing, however some errors in grammar and context are still possible. If you have any questions related to this note please do not hesitate to contact our office.

## 2017-10-17 RX ORDER — VALSARTAN AND HYDROCHLOROTHIAZIDE 320; 12.5 MG/1; MG/1
TABLET, FILM COATED ORAL
Qty: 90 TAB | Refills: 1 | Status: SHIPPED | OUTPATIENT
Start: 2017-10-17 | End: 2018-04-20 | Stop reason: SDUPTHER

## 2017-10-17 NOTE — TELEPHONE ENCOUNTER
Refill X 6 months, sent to pharmacy.Pt. Seen in the last 6 months per protocol.   Lab Results   Component Value Date/Time    SODIUM 139 09/30/2017 09:20 AM    POTASSIUM 4.4 09/30/2017 09:20 AM    CHLORIDE 107 09/30/2017 09:20 AM    CO2 25 09/30/2017 09:20 AM    GLUCOSE 48 (L) 09/30/2017 09:20 AM    BUN 28 (H) 09/30/2017 09:20 AM    CREATININE 1.01 09/30/2017 09:20 AM    CREATININE 1.0 04/05/2006 08:10 AM

## 2017-11-03 ENCOUNTER — HOSPITAL ENCOUNTER (OUTPATIENT)
Dept: LAB | Facility: MEDICAL CENTER | Age: 82
End: 2017-11-03
Attending: INTERNAL MEDICINE
Payer: MEDICARE

## 2017-11-03 LAB
BUN SERPL-MCNC: 42 MG/DL (ref 8–22)
CREAT SERPL-MCNC: 1.2 MG/DL (ref 0.5–1.4)
GFR SERPL CREATININE-BSD FRML MDRD: 58 ML/MIN/1.73 M 2

## 2017-11-03 PROCEDURE — 82565 ASSAY OF CREATININE: CPT

## 2017-11-03 PROCEDURE — 84520 ASSAY OF UREA NITROGEN: CPT

## 2017-11-03 PROCEDURE — 36415 COLL VENOUS BLD VENIPUNCTURE: CPT

## 2017-11-10 ENCOUNTER — ANTICOAGULATION VISIT (OUTPATIENT)
Dept: MEDICAL GROUP | Facility: PHYSICIAN GROUP | Age: 82
End: 2017-11-10
Payer: MEDICARE

## 2017-11-10 VITALS — DIASTOLIC BLOOD PRESSURE: 61 MMHG | SYSTOLIC BLOOD PRESSURE: 147 MMHG | HEART RATE: 49 BPM

## 2017-11-10 DIAGNOSIS — I48.91 ATRIAL FIBRILLATION, UNSPECIFIED TYPE (HCC): ICD-10-CM

## 2017-11-10 DIAGNOSIS — Z79.01 LONG TERM CURRENT USE OF ANTICOAGULANT THERAPY: ICD-10-CM

## 2017-11-10 LAB — INR PPP: 3.2 (ref 2–3.5)

## 2017-11-10 PROCEDURE — 85610 PROTHROMBIN TIME: CPT | Performed by: NURSE PRACTITIONER

## 2017-11-10 NOTE — PROGRESS NOTES
Anticoagulation Summary  As of 11/10/2017    INR goal:   2.0-3.0   TTR:   44.5 % (2.4 y)   Today's INR:   3.2!   Maintenance plan:   3 mg (6 mg x 0.5) on Sun, Tue, Thu; 6 mg (6 mg x 1) all other days   Weekly total:   33 mg   Plan last modified:   Keith Collado PharmD (8/25/2017)   Next INR check:   12/15/2017   Target end date:   Indefinite    Indications    Atrial fibrillation (CMS-HCC) [I48.91]  Long term (current) use of anticoagulants [Z79.01] [Z79.01]             Anticoagulation Episode Summary     INR check location:   [facility]    Preferred lab:       Send INR reminders to:       Comments:   TTR is 45.05%, consider alternative      Anticoagulation Care Providers     Provider Role Specialty Phone number    Dontae Ng D.O. Referring Cardiology [phone]    [facility]   [phone]    LONNY EasleyRSUHAIL  Family Medicine [phone]        Anticoagulation Patient Findings      HPI:  John Hernandez seen in clinic today, on anticoagulation therapy with warfarin for afib  Changes to current medical/health status since last appt:   Denies signs/symptoms of bleeding and/or thrombosis since the last appt.    Denies any interval changes to diet  Denies any interval changes to medications since last appt.   Denies any complications or cost restrictions with current therapy.   BP recorded in vitals.    A/P   INR  supra-therapeutic.   3mg today then continue weekly warfarin dose as noted      Follow up appointment in 5 week(s) per pt      Keith Collado, Tawanda

## 2017-11-14 ENCOUNTER — HOSPITAL ENCOUNTER (OUTPATIENT)
Dept: RADIOLOGY | Facility: MEDICAL CENTER | Age: 82
End: 2017-11-14
Attending: INTERNAL MEDICINE
Payer: MEDICARE

## 2017-11-14 DIAGNOSIS — Z79.4 TYPE 2 DIABETES MELLITUS WITH HYPERGLYCEMIA, WITH LONG-TERM CURRENT USE OF INSULIN (HCC): ICD-10-CM

## 2017-11-14 DIAGNOSIS — R06.00 DYSPNEA, PAROXYSMAL NOCTURNAL: ICD-10-CM

## 2017-11-14 DIAGNOSIS — E11.65 TYPE 2 DIABETES MELLITUS WITH HYPERGLYCEMIA, WITH LONG-TERM CURRENT USE OF INSULIN (HCC): ICD-10-CM

## 2017-11-14 PROCEDURE — 71260 CT THORAX DX C+: CPT

## 2017-11-14 PROCEDURE — 700117 HCHG RX CONTRAST REV CODE 255: Performed by: INTERNAL MEDICINE

## 2017-11-14 RX ADMIN — IOHEXOL 75 ML: 350 INJECTION, SOLUTION INTRAVENOUS at 10:42

## 2017-11-16 ENCOUNTER — HOSPITAL ENCOUNTER (OUTPATIENT)
Dept: LAB | Facility: MEDICAL CENTER | Age: 82
End: 2017-11-16
Attending: INTERNAL MEDICINE
Payer: MEDICARE

## 2017-11-16 DIAGNOSIS — E11.65 TYPE 2 DIABETES MELLITUS WITH HYPERGLYCEMIA, WITH LONG-TERM CURRENT USE OF INSULIN (HCC): ICD-10-CM

## 2017-11-16 DIAGNOSIS — Z79.4 TYPE 2 DIABETES MELLITUS WITH HYPERGLYCEMIA, WITH LONG-TERM CURRENT USE OF INSULIN (HCC): ICD-10-CM

## 2017-11-16 LAB
ANION GAP SERPL CALC-SCNC: 10 MMOL/L (ref 0–11.9)
BUN SERPL-MCNC: 40 MG/DL (ref 8–22)
CALCIUM SERPL-MCNC: 9.3 MG/DL (ref 8.5–10.5)
CHLORIDE SERPL-SCNC: 107 MMOL/L (ref 96–112)
CO2 SERPL-SCNC: 21 MMOL/L (ref 20–33)
CREAT SERPL-MCNC: 1.15 MG/DL (ref 0.5–1.4)
GFR SERPL CREATININE-BSD FRML MDRD: >60 ML/MIN/1.73 M 2
GLUCOSE SERPL-MCNC: 95 MG/DL (ref 65–99)
POTASSIUM SERPL-SCNC: 4.5 MMOL/L (ref 3.6–5.5)
SODIUM SERPL-SCNC: 138 MMOL/L (ref 135–145)

## 2017-11-16 PROCEDURE — 36415 COLL VENOUS BLD VENIPUNCTURE: CPT

## 2017-11-16 PROCEDURE — 80048 BASIC METABOLIC PNL TOTAL CA: CPT

## 2017-11-17 ENCOUNTER — TELEPHONE (OUTPATIENT)
Dept: ENDOCRINOLOGY | Facility: MEDICAL CENTER | Age: 82
End: 2017-11-17

## 2017-11-17 NOTE — TELEPHONE ENCOUNTER
----- Message from Sky Malloy M.D. sent at 11/17/2017  7:00 AM PST -----  Please inform patient that kidney function is normal. Please advise him to resume Actos.

## 2017-11-20 RX ORDER — WARFARIN SODIUM 6 MG/1
TABLET ORAL
Qty: 90 TAB | Refills: 3 | Status: SHIPPED | OUTPATIENT
Start: 2017-11-20

## 2017-12-11 RX ORDER — OMEPRAZOLE 20 MG/1
40 CAPSULE, DELAYED RELEASE ORAL DAILY
Qty: 60 CAP | Refills: 3 | Status: SHIPPED | OUTPATIENT
Start: 2017-12-11

## 2017-12-13 RX ORDER — PIOGLITAZONEHYDROCHLORIDE 15 MG/1
TABLET ORAL
Qty: 30 TAB | Refills: 4 | Status: SHIPPED | OUTPATIENT
Start: 2017-12-13 | End: 2017-12-13 | Stop reason: SDUPTHER

## 2017-12-13 RX ORDER — PIOGLITAZONEHYDROCHLORIDE 15 MG/1
TABLET ORAL
Qty: 90 TAB | Refills: 2 | Status: SHIPPED | OUTPATIENT
Start: 2017-12-13 | End: 2018-04-10

## 2017-12-15 ENCOUNTER — ANTICOAGULATION VISIT (OUTPATIENT)
Dept: MEDICAL GROUP | Facility: PHYSICIAN GROUP | Age: 82
End: 2017-12-15
Payer: MEDICARE

## 2017-12-15 VITALS — BODY MASS INDEX: 31.63 KG/M2 | WEIGHT: 220.46 LBS

## 2017-12-15 DIAGNOSIS — Z79.01 LONG TERM CURRENT USE OF ANTICOAGULANT THERAPY: ICD-10-CM

## 2017-12-15 LAB — INR PPP: 1.7 (ref 2–3.5)

## 2017-12-15 PROCEDURE — 99211 OFF/OP EST MAY X REQ PHY/QHP: CPT | Performed by: NURSE PRACTITIONER

## 2017-12-15 PROCEDURE — 85610 PROTHROMBIN TIME: CPT | Performed by: NURSE PRACTITIONER

## 2017-12-15 NOTE — PROGRESS NOTES
Anticoagulation Summary  As of 12/15/2017    INR goal:   2.0-3.0   TTR:   45.4 % (2.5 y)   Today's INR:   1.7!   Maintenance plan:   3 mg (6 mg x 0.5) on Sun, Tue, Thu; 6 mg (6 mg x 1) all other days   Weekly total:   33 mg   Plan last modified:   Keith Collado, PharmD (8/25/2017)   Next INR check:   12/29/2017   Target end date:   Indefinite    Indications    Atrial fibrillation (CMS-HCC) [I48.91]  Long term (current) use of anticoagulants [Z79.01] [Z79.01]             Anticoagulation Episode Summary     IN[facility]   [facility]    Preferred lab:       Send INR reminders to:       Comments:   TTR is 45.05%, consider alternative      Anticoagulation Care Providers     Provider Role Specialty Phone number    Dontae Ng D.O. Referring Cardiology [phone]    [facility] Anticoagulation Services   [phone]    AIYANA Easley  Family Medicine [phone]        Anticoagulation Patient Findings      HPI:  John Hernandez seen in clinic today, on anticoagulation therapy with warfarin for Afib.   Reason for today's visit (per our collaborative practice policy) is because their last INR was 3.2 on 1/10/17. Intervention at the last visit:Pt's warfarin was decreased to decrease the risk of bleeding from a supra therapeutic INR.     Changes to current medical/health status since last appt: Pt states there has been none.   States he has had no signs/symptoms of bleeding and/or thrombosis since the last appt.    Confirms no interval changes to diet or any interval changes to medications since last appt.   States no complications or cost restrictions with current therapy.   Pt refused to have vitals taken.   Confirmed dosing regimen.   Denies hx of stroke, avoid bridging given his bleeding risks.     A/P   INR  SUB-therapeutic.   Possible reason(s) INR is not in range today: Unknown, denies missed doses or any other factors that would likely change INR.   INR has been fluctuating  recently.  Bolus today then Pt is to continue with current warfarin dosing regimen.     Follow up appointment in 2 weeks to reduce risk of adverse events related to this high risk medication, Warfarin.  Given fluctuating INR, pt greatly needs prompt follow up to avoid adverse effects of medication therapy.     Purpose of next visit:  They are at a increased risk of stroke because INR is below goal.     Other info:  Pt educated to contact our clinic with any changes in medications or s/s of bleeding or thrombosis  CHEST guidelines recommend frequent INR monitoring at regular intervals (a few days up to a max of 12 weeks) to ensure they are on the proper dose of warfarin and not having any complications from therapy. INRs can dramatically change over a short time period due to diet, medications, and medical conditions.     Ayan Moya, PharmD

## 2018-01-03 ENCOUNTER — HOSPITAL ENCOUNTER (OUTPATIENT)
Dept: LAB | Facility: MEDICAL CENTER | Age: 83
End: 2018-01-03
Attending: INTERNAL MEDICINE
Payer: MEDICARE

## 2018-01-03 LAB
ANION GAP SERPL CALC-SCNC: 8 MMOL/L (ref 0–11.9)
BASOPHILS # BLD AUTO: 2.3 % (ref 0–1.8)
BASOPHILS # BLD: 0.13 K/UL (ref 0–0.12)
BUN SERPL-MCNC: 27 MG/DL (ref 8–22)
CALCIUM SERPL-MCNC: 9.1 MG/DL (ref 8.5–10.5)
CHLORIDE SERPL-SCNC: 104 MMOL/L (ref 96–112)
CO2 SERPL-SCNC: 26 MMOL/L (ref 20–33)
CREAT SERPL-MCNC: 1.12 MG/DL (ref 0.5–1.4)
EOSINOPHIL # BLD AUTO: 0.22 K/UL (ref 0–0.51)
EOSINOPHIL NFR BLD: 3.9 % (ref 0–6.9)
ERYTHROCYTE [DISTWIDTH] IN BLOOD BY AUTOMATED COUNT: 52.6 FL (ref 35.9–50)
GFR SERPL CREATININE-BSD FRML MDRD: >60 ML/MIN/1.73 M 2
GLUCOSE SERPL-MCNC: 119 MG/DL (ref 65–99)
HCT VFR BLD AUTO: 38 % (ref 42–52)
HGB BLD-MCNC: 11.9 G/DL (ref 14–18)
IMM GRANULOCYTES # BLD AUTO: 0.02 K/UL (ref 0–0.11)
IMM GRANULOCYTES NFR BLD AUTO: 0.4 % (ref 0–0.9)
LYMPHOCYTES # BLD AUTO: 0.93 K/UL (ref 1–4.8)
LYMPHOCYTES NFR BLD: 16.3 % (ref 22–41)
MCH RBC QN AUTO: 30.1 PG (ref 27–33)
MCHC RBC AUTO-ENTMCNC: 31.3 G/DL (ref 33.7–35.3)
MCV RBC AUTO: 96.2 FL (ref 81.4–97.8)
MONOCYTES # BLD AUTO: 0.6 K/UL (ref 0–0.85)
MONOCYTES NFR BLD AUTO: 10.5 % (ref 0–13.4)
NEUTROPHILS # BLD AUTO: 3.8 K/UL (ref 1.82–7.42)
NEUTROPHILS NFR BLD: 66.6 % (ref 44–72)
NRBC # BLD AUTO: 0 K/UL
NRBC BLD-RTO: 0 /100 WBC
PLATELET # BLD AUTO: 228 K/UL (ref 164–446)
PMV BLD AUTO: 10 FL (ref 9–12.9)
POTASSIUM SERPL-SCNC: 4.1 MMOL/L (ref 3.6–5.5)
RBC # BLD AUTO: 3.95 M/UL (ref 4.7–6.1)
SODIUM SERPL-SCNC: 138 MMOL/L (ref 135–145)
WBC # BLD AUTO: 5.7 K/UL (ref 4.8–10.8)

## 2018-01-03 PROCEDURE — 36415 COLL VENOUS BLD VENIPUNCTURE: CPT

## 2018-01-03 PROCEDURE — 85025 COMPLETE CBC W/AUTO DIFF WBC: CPT

## 2018-01-03 PROCEDURE — 80048 BASIC METABOLIC PNL TOTAL CA: CPT

## 2018-01-09 ENCOUNTER — TELEMEDICINE2 (OUTPATIENT)
Dept: ENDOCRINOLOGY | Facility: MEDICAL CENTER | Age: 83
End: 2018-01-09
Payer: MEDICARE

## 2018-01-09 ENCOUNTER — HOSPITAL ENCOUNTER (OUTPATIENT)
Dept: LAB | Facility: MEDICAL CENTER | Age: 83
End: 2018-01-09
Attending: INTERNAL MEDICINE
Payer: MEDICARE

## 2018-01-09 VITALS
OXYGEN SATURATION: 90 % | BODY MASS INDEX: 31.78 KG/M2 | HEIGHT: 70 IN | WEIGHT: 222 LBS | DIASTOLIC BLOOD PRESSURE: 62 MMHG | HEART RATE: 70 BPM | SYSTOLIC BLOOD PRESSURE: 122 MMHG

## 2018-01-09 DIAGNOSIS — E11.9 TYPE 2 DIABETES MELLITUS WITHOUT COMPLICATION, WITH LONG-TERM CURRENT USE OF INSULIN (HCC): ICD-10-CM

## 2018-01-09 DIAGNOSIS — I10 ESSENTIAL HYPERTENSION: ICD-10-CM

## 2018-01-09 DIAGNOSIS — Z79.4 TYPE 2 DIABETES MELLITUS WITHOUT COMPLICATION, WITH LONG-TERM CURRENT USE OF INSULIN (HCC): ICD-10-CM

## 2018-01-09 DIAGNOSIS — E78.2 MIXED HYPERLIPIDEMIA: ICD-10-CM

## 2018-01-09 PROCEDURE — 99214 OFFICE O/P EST MOD 30 MIN: CPT | Mod: GT | Performed by: INTERNAL MEDICINE

## 2018-01-09 PROCEDURE — 36415 COLL VENOUS BLD VENIPUNCTURE: CPT | Mod: GA

## 2018-01-09 PROCEDURE — 83036 HEMOGLOBIN GLYCOSYLATED A1C: CPT | Mod: GA

## 2018-01-09 NOTE — PROGRESS NOTES
"Endocrinology Clinic Progress Note (Telemedicine visit)  Primary care physician: AIYANA Easley    CC: Routine follow-up for diabetes    HPI:  John Hernandez is a 82 y.o. old patient with history of type 2 diabetes here for follow-up.    Type 2 diabetes: He is currently on Novolin 70/30, he takes 17 units with breakfast and 17 units with dinner. Reports compliance medications. He checks blood sugars twice a day. Mild hyperglycemia once a month or so, mostly during the day if he misses lunch. He he can feel the low blood sugar symptoms well.    Hypertension: Blood pressure is well controlled. He is on ARB.    Hyperlipidemia: LDL 56. He is currently on pravastatin.    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   • Hypertension 05/03/2013   • Type 2 diabetes mellitus with complication (CMS-HCC) 05/03/2013   • PVD (peripheral vascular disease) (CMS-HCC) 05/03/2013   • Atrial fibrillation (CMS-HCC) 05/03/2013   • Esophageal reflux 05/03/2013   • Dyslipidemia 05/03/2013     Physical Examination:  Vital signs: /62   Pulse 70   Ht 1.778 m (5' 10\")   Wt 100.7 kg (222 lb)   SpO2 90%   BMI 31.85 kg/m²   General: No apparent distress, cooperative  Eyes: No obvious exophthalmos  ENMT: Normal on external inspection of nose, lips  Neck: On visual inspection no obvious mass seen  Resp: Normal effort  Neuro: Alert and oriented  Skin: No rash on visible skin  Psych: Normal mood and affect    Assessment and Plan:    Type 2 " diabetes mellitus without complication, with long-term current use of insulin (CMS-formerly [facility])  · Hemoglobin A1c in October was 5.7%   · Goal hemoglobin A1c less than 8%  · We will repeat hemoglobin A1c today  · For now continue Novolin 70/30 17 units with breakfast and 17 units with dinner    Essential hypertension  · Blood pressure is well controlled  · Continue ARB    Mixed hyperlipidemia  · LDL 56  · Continue pravastatin    Return in about 3 months (around 4/9/2018).    Thank you for allowing me to participate in the care of this patient.    This visit was conducted utilizing secure and encrypted videoconferencing equipment with the assistance of a trained tele-presenter at the originating site.    Sky Malloy M.D.      CC:   Ria Alford, A.P.RLunaN.    This note was created using voice recognition software (Dragon). The accuracy of the dictation is limited by the abilities of the software. I have reviewed the note prior to signing, however some errors in grammar and context are still possible. If you have any questions related to this note please do not hesitate to contact our office.

## 2018-01-11 LAB
EST. AVERAGE GLUCOSE BLD GHB EST-MCNC: 117 MG/DL
HBA1C MFR BLD: 5.7 % (ref 0–5.6)

## 2018-01-12 ENCOUNTER — ANTICOAGULATION VISIT (OUTPATIENT)
Dept: MEDICAL GROUP | Facility: PHYSICIAN GROUP | Age: 83
End: 2018-01-12
Payer: MEDICARE

## 2018-01-12 VITALS
WEIGHT: 222 LBS | HEART RATE: 67 BPM | DIASTOLIC BLOOD PRESSURE: 64 MMHG | BODY MASS INDEX: 31.85 KG/M2 | SYSTOLIC BLOOD PRESSURE: 147 MMHG

## 2018-01-12 DIAGNOSIS — Z79.01 LONG TERM CURRENT USE OF ANTICOAGULANT THERAPY: ICD-10-CM

## 2018-01-12 LAB — INR PPP: 3 (ref 2–3.5)

## 2018-01-12 PROCEDURE — 85610 PROTHROMBIN TIME: CPT | Performed by: NURSE PRACTITIONER

## 2018-01-12 NOTE — PROGRESS NOTES
OP Anticoagulation Service Note    Date: 1/12/2018  Vitals:    01/12/18 0924   BP: 147/64   Pulse: 67   Weight: 100.7 kg (222 lb)       Anticoagulation Summary  As of 1/12/2018    INR goal:   2.0-3.0   TTR:   46.3 % (2.5 y)   Today's INR:   3.0   Maintenance plan:   3 mg (6 mg x 0.5) on Sun, Tue, Thu; 6 mg (6 mg x 1) all other days   Weekly total:   33 mg   Plan last modified:   Keith Collado, PharmD (8/25/2017)   Next INR check:   2/23/2018   Target end date:   Indefinite    Indications    Atrial fibrillation (CMS-HCC) [I48.91]  Long term (current) use of anticoagulants [Z79.01] [Z79.01]             Anticoagulation Episode Summary     [facility]   [facility]    Preferred lab:       Send INR reminders to:       Comments:   TTR is 45.05%, consider alternative      Anticoagulation Care Providers     Provider Role Specialty Phone number    Dontae Ng D.O. Referring Cardiology [phone]    [facility] Anticoagulation Services   [phone]    AIYANA Easley  Family Medicine [phone]        Anticoagulation Patient Findings      HPI:   John MOISE Seranikhiljv seen in clinic today, they are here today for a INR check on anticoagulation therapy with warfarin because they have a PMH of afib    The reason for today's visit is to prevent morbidity and mortality from a  stroke and to reduce the risk of bleeding while on a anticoagulant.     Reason for today's visit (per our collaborative practice policy) is because their last INR was 1.7 on 12/15/2017.  Intervention at the last visit: increased the dose then resumed normal dose    Additional education provided today regarding reducing bleed risk and dietary constraints: Yes about possible procedure     Any upcoming  procedures: might have a stent soon, TBD    Confirmed warfarin dosing regimen  Interval Changes with foods rich in vitamin K:No  Interval Changes in ETOH:  No  Interval Changes in smoking status: No  Interval Changes in medication: No    Cost restriction: No    S/S of bleeding or bruising:  No  Signs/symptoms  thrombosis since the last appt: No  Bleed risk is: moderate,     3 vitals included with today's appt:Yes  (BP, HR, weight, ht, RR)     Assessment:   INR  therapeutic.     Possible stent placement. He will be talking to the DrLuna Next week.     Intervention required today to prevent:    No change in dose needed today, they will need to continue with the same dose and diet to prevent adverse events while on a anticoagulant.     They have a TTR of 46.3 which is not at target (TTR target/goal is 100%) and requires close follow up to prevent a adverse event (the lower the TTR the higher risk of clots, strokes, or bleeding).       Plan:  Continue weekly warfarin dose as noted and he will call us if he is having a stent placed.     Follow up:  Follow up appointment in 6 week(s)       Other info:  Pt educated to contact our clinic with any changes in medications or s/s of bleeding or thrombosis    CHEST guidelines recommend frequent INR monitoring at regular intervals (a few days up to a max of 12 weeks) to ensure they are on the proper dose of warfarin and not having any complications from therapy.  INRs can dramatically change over a short time period due to diet, medications, and medical conditions.

## 2018-02-14 ENCOUNTER — OFFICE VISIT (OUTPATIENT)
Dept: MEDICAL GROUP | Facility: PHYSICIAN GROUP | Age: 83
End: 2018-02-14
Payer: MEDICARE

## 2018-02-14 VITALS
HEART RATE: 32 BPM | WEIGHT: 220 LBS | TEMPERATURE: 97.5 F | HEIGHT: 70 IN | DIASTOLIC BLOOD PRESSURE: 72 MMHG | OXYGEN SATURATION: 96 % | SYSTOLIC BLOOD PRESSURE: 126 MMHG | BODY MASS INDEX: 31.5 KG/M2 | RESPIRATION RATE: 20 BRPM

## 2018-02-14 DIAGNOSIS — R06.02 SHORTNESS OF BREATH: ICD-10-CM

## 2018-02-14 DIAGNOSIS — S98.912S: ICD-10-CM

## 2018-02-14 DIAGNOSIS — I10 ESSENTIAL HYPERTENSION: ICD-10-CM

## 2018-02-14 DIAGNOSIS — I48.21 PERMANENT ATRIAL FIBRILLATION (HCC): ICD-10-CM

## 2018-02-14 DIAGNOSIS — E11.8 TYPE 2 DIABETES MELLITUS WITH COMPLICATION, WITH LONG-TERM CURRENT USE OF INSULIN (HCC): ICD-10-CM

## 2018-02-14 DIAGNOSIS — T87.9: ICD-10-CM

## 2018-02-14 DIAGNOSIS — H91.93 BILATERAL HEARING LOSS, UNSPECIFIED HEARING LOSS TYPE: ICD-10-CM

## 2018-02-14 DIAGNOSIS — I70.208 BRACHIAL ARTERY OCCLUSION, RIGHT (HCC): ICD-10-CM

## 2018-02-14 DIAGNOSIS — Z79.4 TYPE 2 DIABETES MELLITUS WITH COMPLICATION, WITH LONG-TERM CURRENT USE OF INSULIN (HCC): ICD-10-CM

## 2018-02-14 LAB — GLUCOSE BLD-MCNC: 107 MG/DL (ref 70–100)

## 2018-02-14 PROCEDURE — 99214 OFFICE O/P EST MOD 30 MIN: CPT | Performed by: NURSE PRACTITIONER

## 2018-02-14 PROCEDURE — 82962 GLUCOSE BLOOD TEST: CPT | Performed by: NURSE PRACTITIONER

## 2018-02-14 RX ORDER — CLOPIDOGREL BISULFATE 75 MG/1
75 TABLET ORAL DAILY
COMMUNITY
Start: 2017-12-23 | End: 2018-04-13

## 2018-02-14 NOTE — ASSESSMENT & PLAN NOTE
This is a chronic condition, stable and managed by [facility]. He is also followed by vascular medicine.

## 2018-02-14 NOTE — ASSESSMENT & PLAN NOTE
This is a chronic condition, stable and fairly well-controlled on current medications. His last hemoglobin A1c was down to 5.8% and his closely followed by endocrinology. He is tolerating all of his medications well with no significant or bothersome side effects. Due to his symptoms this morning in the office including weakness, shortness of breath and chest pain, blood sugar was checked, however this was 106. He is to continue on his medications and care per his endocrinologist.

## 2018-02-14 NOTE — ASSESSMENT & PLAN NOTE
Patient has history of amputation of left foot secondary to trauma several years ago. He does continue to worse prostatic and does continue to see prosthetic specialist.

## 2018-02-14 NOTE — PROGRESS NOTES
Chief Complaint   Patient presents with   • Weakness     SOB, pain in shoulder blades         This is a 83 y.o.male patient that presents today with the following: Follow-up visit, acute and chronic conditions    Shortness of breath  Patient reports that he has had shortness of breath and chest pain off and on since he had heart catheterization by his cardiologist at [facility] a couple months ago. He tells me that he there are plans to place stents but they were unable to do so with a catheterization via the right radial artery. We have requested those records. He states that he initially had the symptoms when he came in today, but they have resolved. When the medical assistant first took vital signs she measured her heart rate at 32, BP EKG does show heart rate at 75 with sinus rhythm. It also shows ventricular bigeminy and right bundle branch block. This was reviewed by myself as well as my supervising physician. Patient reports to me that he does not have an upcoming appointment with cardiology until April, we were able to reschedule this appointment for Friday at 245. A copy of the EKG was faxed to his cardiologist's office. I did give patient a very strict ER precaution should his symptoms return or if they worsen or become severe.    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 His last hemoglobin A1c was down to 5.8% and his closely followed by endocrinology. He is tolerating all of his medications well with no significant or bothersome side effects. Due to his symptoms this morning in the office including weakness, shortness of breath and chest pain, blood sugar was checked, however this was 106. He is to continue on his medications and care per his endocrinologist.    Atrial fibrillation  This is a chronic condition, stable. He is followed by cardiologist and anticoagulation clinic. He is on  Coumadin.    Bilateral hearing loss  Patient is significantly hard of hearing. He worked in a job for several years in the past that exposed to very loud noises that contributed to his hearing loss. At one time he did have hearing aids which lasted about 6 months before they broke and he never replace them. I did discuss with him the importance of having his hearing checked again and advised him to check with his supplementary insurance for a hearing aid benefit. Referral has been placed on audiology.    Hypertension  This is a chronic condition, stable and well-controlled on current medications including amlodipine, hydralazine and valsartan-hydrochlorothiazide. His blood pressure today is 126/72 and he denies symptoms of hypertension. His labs are routinely ordered by cardiology and they have been stable.    Amputation of foot, left, traumatic, complicated (CMS-HCC)  Patient has history of amputation of left foot secondary to trauma several years ago. He does continue to worse prostatic and does continue to see prosthetic specialist.    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 He is also followed by vascular medicine.      No visits with results within 1 Month(s) from this visit.   Latest known visit with results is:   Anticoagulation Visit on 01/12/2018   Component Date Value   • INR 01/12/2018 3.0              Past Medical History:   Diagnosis Date   • Arrhythmia     ATRIAL FIB   • Dental disorder    • Glaucoma     BLIND RIGHT EYE   • Heart murmur    • Hypertension    • Peripheral vascular disease (CMS-HCC)    • Urinary bladder disorder     BPH   • Wound of left leg     COVERED W/ DRESSING OA TO PACU       Past Surgical History:   Procedure Laterality Date   • BRACHIAL EMBOLECTOMY  5/3/2013    Performed by Ananth Cook M.D. at SURGERY [facility] ORS   • OTHER NEUROLOGICAL SURG      BKA LEFT LEG       No family history on file.    Patient has no  "known allergies.    Current Outpatient Prescriptions Ordered in [facility]   Medication Sig Dispense Refill   • insulin 70/30 (NOVOLIN 70/30 RELION) (70-30) 100 UNIT/ML Suspension 17 units before breakfast and 17 units before dinner 20 mL 6   • pioglitazone (ACTOS) 15 MG Tab TAKE ONE TABLET BY MOUTH ONCE DAILY 90 Tab 2   • omeprazole (PRILOSEC) 20 MG delayed-release capsule Take 2 Caps by mouth every day. 60 Cap 3   • warfarin (COUMADIN) 6 MG Tab Take 1/2-1 tablet by mouth daily as directed by Anticoagulation Program 90 Tab 3   • valsartan-hydrochlorothiazide (DIOVAN-HCT) 320-12.5 MG per tablet TAKE ONE TABLET BY MOUTH ONCE DAILY 90 Tab 1   • finasteride (PROSCAR) 5 MG Tab Take 1 Tab by mouth every day. 90 Tab 1   • pravastatin (PRAVACHOL) 40 MG tablet Take 1 Tab by mouth every bedtime. 90 Tab 3   • Blood Glucose Monitoring Suppl SUPPLIES Misc One Touch ultra blue glucometer strips for blood sugar checked twice a day, 90 days 200 Each 3   • Insulin Syringe-Needle U-100 (INSULIN SYRINGE .5CC/31GX5/16\") 31G X 5/16\" 0.5 ML Misc For insulin injections 3 times a day 300 Each 2   • furosemide (LASIX) 40 MG Tab Take 40 mg by mouth every day.     • hydrALAZINE (APRESOLINE) 25 MG TABS Take 25 mg by mouth 3 times a day.     • potassium chloride CR (K-TABS) 10 MEQ tablet Take 20 mEq by mouth every day.     • amlodipine (NORVASC) 5 MG TABS Take 5 mg by mouth every day.     • vitamin D (CHOLECALCIFEROL) 1000 UNIT TABS Take 1,000 Units by mouth every day.     • clopidogrel (PLAVIX) 75 MG Tab Take 75 mg by mouth every day.       No current [facility]-ordered facility-administered medications on file.        Constitutional ROS: No unexpected change in weight, No unexplained fevers, sweats, or chills, Positive for weakness, per history of present illness  Pulmonary ROS: No chronic cough, sputum, or hemoptysis, No shortness of breath, No recent change in breathing  Cardiovascular ROS: Positive per history of present illness  Gastrointestinal " "ROS: No abdominal pain, No nausea, vomiting, diarrhea, or constipation, no blood in stool  Musculoskeletal/Extremities ROS: No clubbing, No pain, redness or swelling on the joints, Positive for left foot amputation, per history of present illness  Neurologic ROS: Normal development, No seizures, No weakness  Endocrine ROS: Positive per history of present illness    Physical exam:  /72   Pulse (!) 32   Temp 36.4 °C (97.5 °F)   Resp 20   Ht 1.778 m (5' 10\")   Wt 99.8 kg (220 lb)   SpO2 96%   BMI 31.57 kg/m²   General Appearance: Elderly male, alert, no distress, obese, well-groomed  Skin: Skin color, texture, turgor normal. No rashes or lesions.  Lungs: negative findings: normal respiratory rate and rhythm, lungs clear to auscultation  Heart: positive findings: Irregular rhythm, bradycardic  Abdomen: Abdomen soft, non-tender. BS normal. No masses,  No organomegaly  Musculoskeletal: positive findings: Left foot amputation, prosthetic present  Neurologic: intact, oriented, mood appropriate, judgment intact. Cranial nerves II-12 grossly intact    Medical decision making/discussion: Patient to follow-up with cardiologist this Friday. He has been given very strict ER precautions should symptoms worsen or become severe. He is to take all his medications as prescribed and call for refills as needed. He is to continue care per all specialists including cardiology, Coumadin clinic and vascular medicine as well as endocrinology. He has been referred to audiology for significant bilateral hearing loss. I advised him to check with his supplemental insurance for hearing aid benefit.    John MOISE was seen today for weakness.    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 I have made every reasonable attempt to correct obvious errors, but I expect that there are errors of grammar and possibly content that I did not discover before finalizing the note.

## 2018-02-14 NOTE — ASSESSMENT & PLAN NOTE
This is a chronic condition, stable. He is followed by cardiologist and anticoagulation clinic. He is on Coumadin.

## 2018-02-14 NOTE — ASSESSMENT & PLAN NOTE
Patient is significantly hard of hearing. He worked in a job for several years in the past that exposed to very loud noises that contributed to his hearing loss. At one time he did have hearing aids which lasted about 6 months before they broke and he never replace them. I did discuss with him the importance of having his hearing checked again and advised him to check with his supplementary insurance for a hearing aid benefit. Referral has been placed on audiology.

## 2018-02-14 NOTE — ASSESSMENT & PLAN NOTE
This is a chronic condition, stable and well-controlled on current medications including amlodipine, hydralazine and valsartan-hydrochlorothiazide. His blood pressure today is 126/72 and he denies symptoms of hypertension. His labs are routinely ordered by cardiology and they have been stable.

## 2018-02-14 NOTE — ASSESSMENT & PLAN NOTE
Patient reports that he has had shortness of breath and chest pain off and on since he had heart catheterization by his cardiologist at [facility] a couple months ago. He tells me that he there are plans to place stents but they were unable to do so with a catheterization via the right radial artery. We have requested those records. He states that he initially had the symptoms when he came in today, but they have resolved. When the medical assistant first took vital signs she measured her heart rate at 32, BP EKG does show heart rate at 75 with sinus rhythm. It also shows ventricular bigeminy and right bundle branch block. This was reviewed by myself as well as my supervising physician. Patient reports to me that he does not have an upcoming appointment with cardiology until April, we were able to reschedule this appointment for Friday at 245. A copy of the EKG was faxed to his cardiologist's office. I did give patient a very strict ER precaution should his symptoms return or if they worsen or become severe.

## 2018-03-09 LAB — INR PPP: 2.7 (ref 2–3.5)

## 2018-03-12 ENCOUNTER — ANTICOAGULATION MONITORING (OUTPATIENT)
Dept: VASCULAR LAB | Facility: MEDICAL CENTER | Age: 83
End: 2018-03-12

## 2018-03-12 DIAGNOSIS — Z79.01 LONG TERM CURRENT USE OF ANTICOAGULANT THERAPY: ICD-10-CM

## 2018-03-12 DIAGNOSIS — I48.91 ATRIAL FIBRILLATION, UNSPECIFIED TYPE (HCC): ICD-10-CM

## 2018-03-12 NOTE — PROGRESS NOTES
Anticoagulation Summary  As of 3/12/2018    INR goal:   2.0-3.0   TTR:   49.4 % (2.7 y)   Today's INR:   2.7 (3/9/2018)   Maintenance plan:   3 mg (6 mg x 0.5) on Sun, Tue, Thu; 6 mg (6 mg x 1) all other days   Weekly total:   33 mg   Plan last modified:   Keith Collado, PharmD (8/25/2017)   Next INR check:   3/16/2018   Target end date:   Indefinite    Indications    Atrial fibrillation (CMS-HCC) [I48.91]  Long term (current) use of anticoagulants [Z79.01] [Z79.01]             Anticoagulation Episode Summary     [facility] check [facility]   [facility]    Preferred lab:       Send INR reminders to:       Comments:   TTR is 45.05%, consider alternative - [facility]       Anticoagulation Care Providers     Provider Role Specialty Phone number    Dontae Ng D.O. Referring Cardiology [phone]    [facility] Anticoagulation Services   [phone]    AIYANA Easley  Family Medicine [phone]        Anticoagulation Patient Findings    Spoke with patient to report a therapeutic INR.    Pt instructed to continue with current warfarin dosing regimen, confirms dosing.   Pt denies any s/s of bleeding, bruising, clotting or any changes to diet or medication.    Will follow up in 1 week(s).  With [facility].     Melania Gomez, PharmD

## 2018-03-14 RX ORDER — FINASTERIDE 5 MG/1
5 TABLET, FILM COATED ORAL DAILY
Qty: 90 TAB | Refills: 0 | Status: SHIPPED | OUTPATIENT
Start: 2018-03-14

## 2018-03-14 RX ORDER — FINASTERIDE 5 MG/1
TABLET, FILM COATED ORAL
Refills: 1 | OUTPATIENT
Start: 2018-03-14

## 2018-03-16 ENCOUNTER — ANTICOAGULATION MONITORING (OUTPATIENT)
Dept: VASCULAR LAB | Facility: MEDICAL CENTER | Age: 83
End: 2018-03-16

## 2018-03-16 DIAGNOSIS — Z79.01 LONG TERM CURRENT USE OF ANTICOAGULANT THERAPY: ICD-10-CM

## 2018-03-16 LAB — INR PPP: 2.1 (ref 2–3.5)

## 2018-03-16 NOTE — PROGRESS NOTES
Anticoagulation Summary  As of 3/16/2018    INR goal:   2.0-3.0   TTR:   49.7 % (2.7 y)   Today's INR:   2.1   Maintenance plan:   3 mg (6 mg x 0.5) on Sun, Tue, Thu; 6 mg (6 mg x 1) all other days   Weekly total:   33 mg   Plan last modified:   Keith Collado, PharmD (8/25/2017)   Next INR check:   3/23/2018   Target end date:   Indefinite    Indications    Atrial fibrillation (CMS-HCC) [I48.91]  Long term (current) use of anticoagulants [Z79.01] [Z79.01]             Anticoagulation Episode Summary     INR check location:   [facility]    Preferred lab:       Send INR reminders to:       Comments:   TTR is 45.05%, consider alternative - BR[facility]       Anticoagulation Care Providers     Provider Role Specialty Phone number    Dontae Ng D.O. Referring Cardiology [phone]    [facility] Anticoagulation Services   [phone]    LONNY EasleyRSUHAIL  Family Medicine [phone]        Anticoagulation Patient Findings    Spoke with patient to report a therapeutic INR.    Pt instructed to continue with current warfarin dosing regimen, confirms dosing.   Pt denies any s/s of bleeding, bruising, clotting or any changes to diet or medication.    Will follow up in 1 week(s) with [facility].     Melania Gomez, PharmD

## 2018-03-19 ENCOUNTER — ANTICOAGULATION MONITORING (OUTPATIENT)
Dept: VASCULAR LAB | Facility: MEDICAL CENTER | Age: 83
End: 2018-03-19

## 2018-03-19 DIAGNOSIS — Z79.01 LONG TERM CURRENT USE OF ANTICOAGULANT THERAPY: ICD-10-CM

## 2018-03-19 LAB — INR PPP: 2.5 (ref 2–3.5)

## 2018-03-19 NOTE — PROGRESS NOTES
Anticoagulation Summary  As of 3/19/2018    INR goal:   2.0-3.0   TTR:   49.9 % (2.7 y)   Today's INR:   2.5   Maintenance plan:   3 mg (6 mg x 0.5) on Sun, Tue, Thu; 6 mg (6 mg x 1) all other days   Weekly total:   33 mg   Plan last modified:   Keith Collado, PharmD (8/25/2017)   Next INR check:   3/23/2018   Target end date:   Indefinite    Indications    Atrial fibrillation (CMS-HCC) [I48.91]  Long term (current) use of anticoagulants [Z79.01] [Z79.01]             Anticoagulation Episode Summary     INR check location:   [facility]    Preferred lab:       Send INR reminders to:       Comments:   TTR is 45.05%, consider alternative - JEMIMA       Anticoagulation Care Providers     Provider Role Specialty Phone number    Dontae Ng D.O. Referring Cardiology [phone]    [facility]agulation [facility]   [phone]    LONNY EasleyRKIT.  Family Medicine [phone]        Anticoagulation Patient Findings    Left voicemail message to report a therapeutic INR of 2.5.    Pt to continue with current warfarin dosing regimen. Requested pt contact the clinic for any s/s of unusual bleeding, bruising, clotting or any changes to diet or medication.    FU INR in 5 days with Stella .    Melania Gomez, PharmD

## 2018-03-23 ENCOUNTER — ANTICOAGULATION MONITORING (OUTPATIENT)
Dept: VASCULAR LAB | Facility: MEDICAL CENTER | Age: 83
End: 2018-03-23

## 2018-03-23 DIAGNOSIS — Z79.01 LONG TERM CURRENT USE OF ANTICOAGULANT THERAPY: ICD-10-CM

## 2018-03-23 DIAGNOSIS — I48.91 ATRIAL FIBRILLATION, UNSPECIFIED TYPE (HCC): ICD-10-CM

## 2018-03-23 LAB — INR PPP: 2.7 (ref 2–3.5)

## 2018-03-23 NOTE — PROGRESS NOTES
Anticoagulation Summary  As of 3/23/2018    INR goal:   2.0-3.0   TTR:   50.1 % (2.7 y)   Today's INR:   2.7   Maintenance plan:   3 mg (6 mg x 0.5) on Sun, Tue, Thu; 6 mg (6 mg x 1) all other days   Weekly total:   33 mg   No change documented:   Jayme SHEETS'Rayramakrishna, Med Ass't   Plan last modified:   Mary MyersD (8/25/2017)   Next INR check:   3/30/2018   Target end date:   Indefinite    Indications    Atrial fibrillation (CMS-HCC) [I48.91]  Long term (current) use of anticoagulants [Z79.01] [Z79.01]             Anticoagulation Episode Summary     INR check location:   [facility]    Preferred lab:       Send INR reminders to:       Comments:   TTR is 45.05%, consider alternative - JEMIMA       Anticoagulation Care Providers     Provider Role Specialty Phone number    Dontae Ng D.O. [facility] Cardiology [phone]    [facility]ticoagulation Services   [phone]    AIYANA Easley  Family Medicine [phone]        Anticoagulation Patient Findings  Patient Findings     Negatives:   Signs/symptoms of thrombosis, Signs/symptoms of bleeding, Laboratory test error suspected, Change in health, Change in alcohol use, Change in activity, Upcoming invasive procedure, Emergency department visit, Upcoming dental procedure, Missed doses, Extra doses, Change in medications, Change in diet/appetite, Hospital admission, Bruising, Other complaints        Left voicemail message to report therapeutic INR of 2.7.  Patient to continue with current warfarin dosing regimen. Requested pt contact the clinic for any change to diet or medication, and to report any signs or symptoms of bleeding, bruising or clotting.  Pt to follow up in 1 week.    Kahumberto SHEETS'Rayramakrishna, Med Ass't      I have reviewed and agree with the plan above on 03/23/2018      Monica Thomson, MaryD

## 2018-03-30 ENCOUNTER — ANTICOAGULATION MONITORING (OUTPATIENT)
Dept: VASCULAR LAB | Facility: MEDICAL CENTER | Age: 83
End: 2018-03-30

## 2018-03-30 DIAGNOSIS — Z79.01 LONG TERM CURRENT USE OF ANTICOAGULANT THERAPY: ICD-10-CM

## 2018-03-30 LAB — INR PPP: 2.4 (ref 2–3.5)

## 2018-03-30 NOTE — PROGRESS NOTES
Anticoagulation Summary  As of 3/30/2018    INR goal:   2.0-3.0   TTR:   50.4 % (2.8 y)   Today's INR:   2.4   Maintenance plan:   3 mg (6 mg x 0.5) on Sun, Tue, Thu; 6 mg (6 mg x 1) all other days   Weekly total:   33 mg   Plan last modified:   Keith Collado, PharmD (8/25/2017)   Next INR check:   4/13/2018   Target end date:   Indefinite    Indications    Atrial fibrillation (CMS-HCC) [I48.91]  Long term (current) use of anticoagulants [Z79.01] [Z79.01]             Anticoagulation Episode Summary     INR check location:   [facility]    Preferred lab:       Send INR reminders to:       Comments:   TTR is 45.05%, consider alternative - JEMIMA       Anticoagulation Care Providers     Provider Role Specialty Phone number    Dontae Ng D.O. Referring Cardiology [phone]    [facility]   [phone]    LONNY EasleyRKIT.  Family Medicine [phone]        Anticoagulation Patient Findings    Left voicemail message to report a therapeutic INR of 2.4.  Pt to continue with current warfarin dosing regimen. Requested pt contact the clinic for any s/s of unusual bleeding, bruising, clotting or any changes to diet or medication. FU 2 weeks.    Ayan Moya, PharmD

## 2018-04-03 DIAGNOSIS — Z79.01 CHRONIC ANTICOAGULATION: ICD-10-CM

## 2018-04-10 ENCOUNTER — TELEMEDICINE ORIGINATING SITE VISIT (OUTPATIENT)
Dept: MEDICAL GROUP | Facility: PHYSICIAN GROUP | Age: 83
End: 2018-04-10
Payer: MEDICARE

## 2018-04-10 ENCOUNTER — TELEMEDICINE2 (OUTPATIENT)
Dept: ENDOCRINOLOGY | Facility: MEDICAL CENTER | Age: 83
End: 2018-04-10
Payer: MEDICARE

## 2018-04-10 ENCOUNTER — HOSPITAL ENCOUNTER (OUTPATIENT)
Dept: LAB | Facility: MEDICAL CENTER | Age: 83
End: 2018-04-10
Attending: INTERNAL MEDICINE
Payer: MEDICARE

## 2018-04-10 VITALS
DIASTOLIC BLOOD PRESSURE: 64 MMHG | SYSTOLIC BLOOD PRESSURE: 126 MMHG | HEIGHT: 70 IN | HEART RATE: 60 BPM | OXYGEN SATURATION: 96 % | BODY MASS INDEX: 28.63 KG/M2 | WEIGHT: 200 LBS

## 2018-04-10 DIAGNOSIS — Z79.4 TYPE 2 DIABETES MELLITUS WITHOUT COMPLICATION, WITH LONG-TERM CURRENT USE OF INSULIN (HCC): ICD-10-CM

## 2018-04-10 DIAGNOSIS — E11.9 TYPE 2 DIABETES MELLITUS WITHOUT COMPLICATION, WITH LONG-TERM CURRENT USE OF INSULIN (HCC): ICD-10-CM

## 2018-04-10 DIAGNOSIS — E11.8 DIABETIC COMPLICATION (HCC): ICD-10-CM

## 2018-04-10 DIAGNOSIS — E78.2 MIXED HYPERLIPIDEMIA: ICD-10-CM

## 2018-04-10 DIAGNOSIS — I10 ESSENTIAL HYPERTENSION: ICD-10-CM

## 2018-04-10 LAB
EST. AVERAGE GLUCOSE BLD GHB EST-MCNC: 111 MG/DL
HBA1C MFR BLD: 5.5 % (ref 0–5.6)

## 2018-04-10 PROCEDURE — 99214 OFFICE O/P EST MOD 30 MIN: CPT | Mod: GT | Performed by: INTERNAL MEDICINE

## 2018-04-10 PROCEDURE — 36415 COLL VENOUS BLD VENIPUNCTURE: CPT | Mod: GA

## 2018-04-10 PROCEDURE — 83036 HEMOGLOBIN GLYCOSYLATED A1C: CPT | Mod: GA

## 2018-04-10 NOTE — PROGRESS NOTES
"Endocrinology Clinic Progress Note (Telemedicine visit)  Primary care physician: AIYANA Easley    CC: Routine follow-up for diabetes    HPI:  John Hernandez is a 82 y.o. old patient with history of type 2 diabetes here for follow-up.    Type 2 diabetes: He is currently on Novolin 70/30, he takes 17 units with breakfast and 17 units with dinner. Reports compliance medications. He checks blood sugars twice a day. Most blood sugar readings are in the range of 120-160. No hypoglycemia.    Hypertension: Blood pressure is well controlled. He is on ARB.    Hyperlipidemia: LDL 56. He is currently on pravastatin, tolerating well.    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   • Hypertension 05/03/2013   • Type 2 diabetes mellitus with complication (CMS-HCC) 05/03/2013   • PVD (peripheral vascular disease) (CMS-HCC) 05/03/2013   • Atrial fibrillation (CMS-HCC) 05/03/2013   • Esophageal reflux 05/03/2013   • Dyslipidemia 05/03/2013     Physical Examination:  Vital signs: /64   Pulse 60   Ht 1.778 m (5' 10\")   Wt 90.7 kg (200 lb)   SpO2 96%   BMI 28.70 kg/m²   General: No apparent distress, cooperative  Eyes: No obvious exophthalmos  ENMT: Normal on external inspection of nose, lips  Neck: On visual inspection no obvious mass seen  Resp: Normal effort  Neuro: Alert and oriented  Skin: No rash on visible skin  Psych: Normal mood and affect    Assessment and Plan:    Type 2 diabetes " mellitus without complication, with long-term current use of insulin (CMS-[facility])  · Hemoglobin A1c in January 2018 was 5.7%  · Goal hemoglobin A1c less than 8%  · We will repeat hemoglobin A1c today  · For now continue Novolin 70/30 17 units with breakfast and 17 units with dinner    Essential hypertension  · Blood pressure is well controlled  · Continue ARB    Mixed hyperlipidemia  · LDL 56  · Continue pravastatin    Return in about 4 months (around 8/10/2018).    Thank you for allowing me to participate in the care of this patient.    This visit was conducted utilizing secure and encrypted videoconferencing equipment with the assistance of a trained tele-presenter at the originating site.    Sky Malloy M.D.      CC:   Ria Alford, A.P.RLunaN.    This note was created using voice recognition software (Dragon). The accuracy of the dictation is limited by the abilities of the software. I have reviewed the note prior to signing, however some errors in grammar and context are still possible. If you have any questions related to this note please do not hesitate to contact our office.

## 2018-04-13 ENCOUNTER — ANTICOAGULATION MONITORING (OUTPATIENT)
Dept: VASCULAR LAB | Facility: MEDICAL CENTER | Age: 83
End: 2018-04-13

## 2018-04-13 DIAGNOSIS — Z79.01 LONG TERM CURRENT USE OF ANTICOAGULANT THERAPY: ICD-10-CM

## 2018-04-13 LAB — INR PPP: 2.6 (ref 2–3.5)

## 2018-04-13 NOTE — PROGRESS NOTES
Anticoagulation Summary  As of 4/13/2018    INR goal:   2.0-3.0   TTR:   51.1 % (2.8 y)   Today's INR:   2.6   Maintenance plan:   3 mg (6 mg x 0.5) on Sun, Tue, Thu; 6 mg (6 mg x 1) all other days   Weekly total:   33 mg   Plan last modified:   Keith Collado, PharmD (8/25/2017)   Next INR check:   4/20/2018   Target end date:   Indefinite    Indications    Atrial fibrillation (CMS-HCC) [I48.91]  Long term (current) use of anticoagulants [Z79.01] [Z79.01]             Anticoagulation Episode Summary     INR check location:   [facility]    Preferred lab:       Send INR reminders to:       Comments:   TTR is 45.05%, consider alternative - BRITTNEY       Anticoagulation Care Providers     Provider Role Specialty Phone number    Dontae Ng D.O. Referring Cardiology [phone]    [facility]   [phone]    LONNY EasleyRSUHAIL  Family Medicine [phone]        Anticoagulation Patient Findings      Spoke with patient to report a therapeutic INR.    Pt instructed to continue with current warfarin dosing regimen, confirms dosing.   Pt denies any s/s of bleeding, bruising, clotting or any changes to diet or medication.    Will follow up in 1 week(s) with [facility].     Melania Gomez, PharmD

## 2018-04-16 ENCOUNTER — TELEPHONE (OUTPATIENT)
Dept: MEDICAL GROUP | Facility: PHYSICIAN GROUP | Age: 83
End: 2018-04-16

## 2018-04-16 NOTE — TELEPHONE ENCOUNTER
Alexandria Busch with [facility] [phone] requesting verbal orders for PRN Wound Care.  Verbal orders given

## 2018-04-20 ENCOUNTER — ANTICOAGULATION MONITORING (OUTPATIENT)
Dept: VASCULAR LAB | Facility: MEDICAL CENTER | Age: 83
End: 2018-04-20

## 2018-04-20 DIAGNOSIS — Z79.01 LONG TERM CURRENT USE OF ANTICOAGULANT THERAPY: ICD-10-CM

## 2018-04-20 LAB — INR PPP: 2.4 (ref 2–3.5)

## 2018-04-20 NOTE — TELEPHONE ENCOUNTER
Was the patient seen in the last year in this department? Yes     Does patient have an active prescription for medications requested? No     Received Request Via: Pharmacy      Pt met protocol?: Yes    LAST OV 02/14/2018

## 2018-04-20 NOTE — PROGRESS NOTES
OP Anticoagulation Service Note    Date: 4/20/2018  Anticoagulation Summary  As of 4/20/2018    INR goal:   2.0-3.0   TTR:   51.5 % (2.8 y)   Today's INR:   2.4   Maintenance plan:   3 mg (6 mg x 0.5) on Sun, Tue, Thu; 6 mg (6 mg x 1) all other days   Weekly total:   33 mg   No change documented:   Ronald Rodriguez Ass't   Plan last modified:   Keith Collado PharmD (8/25/2017)   Next INR check:   4/27/2018   Target end date:   Indefinite    Indications    Atrial fibrillation (CMS-HCC) [I48.91]  Long term (current) use of anticoagulants [Z79.01] [Z79.01]             Anticoagulation Episode Summary     [facility] check [facility]   [facility]    Preferred lab:       Send INR reminders to:       Comments:   TTR is 45.05%, consider alternative - JEMIMA       Anticoagulation Care Providers     Provider Role Specialty Phone number    Dontae Ng D.O. [facility] Cardiology [phone]    [facility] Anticoagulation Services   [phone]    AIYANA Easley  Family Medicine [phone]        Anticoagulation Patient Findings  Patient Findings     Negatives:   Signs/symptoms of thrombosis, Signs/symptoms of bleeding, Laboratory test error suspected, Change in health, Change in alcohol use, Change in activity, Upcoming invasive procedure, Emergency department visit, Upcoming dental procedure, Missed doses, Extra doses, Change in medications, Change in diet/appetite, Hospital admission, Bruising, Other complaints        Plan: Spoke to patient. Patient is therapeutic and will remain on the same dose. Patient reports no unusual bleeding or bruising and no changes to medication or diet. Patient is to be checked again in 2 weeks.    Ronald Rodriguez. Ass't  [facility]    I have reviewed and concur with the above plan     Keith Collado, MaryD

## 2018-04-21 RX ORDER — VALSARTAN AND HYDROCHLOROTHIAZIDE 320; 12.5 MG/1; MG/1
TABLET, FILM COATED ORAL
Qty: 90 TAB | Refills: 0 | Status: SHIPPED | OUTPATIENT
Start: 2018-04-21 | End: 2018-07-24 | Stop reason: SDUPTHER

## 2018-05-04 ENCOUNTER — ANTICOAGULATION VISIT (OUTPATIENT)
Dept: MEDICAL GROUP | Facility: PHYSICIAN GROUP | Age: 83
End: 2018-05-04
Payer: MEDICARE

## 2018-05-04 VITALS — HEART RATE: 50 BPM | DIASTOLIC BLOOD PRESSURE: 64 MMHG | SYSTOLIC BLOOD PRESSURE: 126 MMHG

## 2018-05-04 DIAGNOSIS — Z79.01 LONG TERM CURRENT USE OF ANTICOAGULANT THERAPY: ICD-10-CM

## 2018-05-04 LAB — INR PPP: 3 (ref 2–3.5)

## 2018-05-04 PROCEDURE — 85610 PROTHROMBIN TIME: CPT | Performed by: NURSE PRACTITIONER

## 2018-05-04 PROCEDURE — 99999 PR NO CHARGE: CPT | Performed by: NURSE PRACTITIONER

## 2018-05-14 ENCOUNTER — OFFICE VISIT (OUTPATIENT)
Dept: MEDICAL GROUP | Facility: PHYSICIAN GROUP | Age: 83
End: 2018-05-14
Payer: MEDICARE

## 2018-05-14 VITALS
BODY MASS INDEX: 28.63 KG/M2 | RESPIRATION RATE: 16 BRPM | HEIGHT: 70 IN | TEMPERATURE: 98.8 F | HEART RATE: 46 BPM | DIASTOLIC BLOOD PRESSURE: 46 MMHG | OXYGEN SATURATION: 98 % | SYSTOLIC BLOOD PRESSURE: 104 MMHG | WEIGHT: 200 LBS

## 2018-05-14 DIAGNOSIS — Z79.4 TYPE 2 DIABETES MELLITUS WITH COMPLICATION, WITH LONG-TERM CURRENT USE OF INSULIN (HCC): ICD-10-CM

## 2018-05-14 DIAGNOSIS — T87.9: ICD-10-CM

## 2018-05-14 DIAGNOSIS — Z79.01 LONG TERM CURRENT USE OF ANTICOAGULANT THERAPY: ICD-10-CM

## 2018-05-14 DIAGNOSIS — E78.5 DYSLIPIDEMIA: ICD-10-CM

## 2018-05-14 DIAGNOSIS — E11.8 TYPE 2 DIABETES MELLITUS WITH COMPLICATION, WITH LONG-TERM CURRENT USE OF INSULIN (HCC): ICD-10-CM

## 2018-05-14 DIAGNOSIS — I48.91 ATRIAL FIBRILLATION, UNSPECIFIED TYPE (HCC): ICD-10-CM

## 2018-05-14 DIAGNOSIS — I10 ESSENTIAL HYPERTENSION: ICD-10-CM

## 2018-05-14 DIAGNOSIS — R06.02 SHORTNESS OF BREATH: ICD-10-CM

## 2018-05-14 DIAGNOSIS — S98.912S: ICD-10-CM

## 2018-05-14 PROCEDURE — 99214 OFFICE O/P EST MOD 30 MIN: CPT | Performed by: NURSE PRACTITIONER

## 2018-05-14 ASSESSMENT — PATIENT HEALTH QUESTIONNAIRE - PHQ9: CLINICAL INTERPRETATION OF PHQ2 SCORE: 0

## 2018-05-14 NOTE — ASSESSMENT & PLAN NOTE
This is a chronic condition, stable and fairly well-controlled on current medications. His hemoglobin A1c is in the prediabetes range and he is followed closely by endocrinology. He was recently seen by his endocrinologist. He tolerates medications well with no significant bothersome side effects.

## 2018-05-14 NOTE — ASSESSMENT & PLAN NOTE
Patient is followed by the anticoagulation clinic, he is on Coumadin as he does have atrial fibrillation. He was having trouble with this medication and his INR was significantly elevated and he was having bleeding difficulties. He is now on the current dose and this is under control. He does have upcoming appointment with the anticoagulation clinic.

## 2018-05-14 NOTE — PROGRESS NOTES
Chief Complaint   Patient presents with   • Diabetes     fv 3 month         This is a 83 y.o.male patient that presents today with the following: Follow-up visit    Type 2 diabetes mellitus with complication (CMS-[facility])  This is a chronic condition, stable and fairly well-controlled on current medications. His hemoglobin A1c is in the prediabetes range and he is followed closely by endocrinology. He was recently seen by his endocrinologist. He tolerates medications well with no significant bothersome side effects.    Atrial fibrillation  This is a chronic condition, stable and controlled with current medications. He is followed by cardiology in the anticoagulation clinic. He is on Coumadin, he has upcoming appointment with the anticoagulation clinic at the end of this month.    Amputation of foot, left, traumatic, complicated (CMS-HCC)  Patient has history of amputation of his left foot secondary to trauma several years ago. He has continued to wear her prosthetic and does continue to see a prosthetic specialist. He is currently being fitted for a new prosthetic.    Long term (current) use of anticoagulants [Z79.01]  Patient is followed by the anticoagulation clinic, he is on Coumadin as he does have atrial fibrillation. He was having trouble with this medication and his INR was significantly elevated and he was having bleeding difficulties. He is now on the current dose and this is under control. He does have upcoming appointment with the anticoagulation clinic.    Shortness of breath  Patient was last seen by me in mid February he was complaining of shortness of breath and chest pain off and on since he had attempted heart catheterization by cardiologist at [facility] in December 2017. We were able to get him in to see his cardiologist later that week in mid February and he was taken some time in March to have heart catheterization with stent placement. We have requested these records. He does not report any  "more shortness of breath or chest pain.      Anticoagulation Visit on 05/04/2018   Component Date Value   • INR 05/04/2018 3    Anticoagulation Monitoring on 04/20/2018   Component Date Value   • INR 04/20/2018 2.4          clinical course has been stable    Past Medical History:   Diagnosis Date   • Arrhythmia     ATRIAL FIB   • Dental disorder    • Glaucoma     BLIND RIGHT EYE   • Heart murmur    • Hypertension    • Peripheral vascular disease (HCC)    • Urinary bladder disorder     BPH   • Wound of left leg     COVERED W/ DRESSING OA TO PACU       Past Surgical History:   Procedure Laterality Date   • BRACHIAL EMBOLECTOMY  5/3/2013    Performed by Ananth Cook M.D. at SURGERY [facility] ORS   • OTHER NEUROLOGICAL SURG      BKA LEFT LEG       No family history on file.    Patient has no known allergies.    Current Outpatient Prescriptions Ordered in [facility]   Medication Sig Dispense Refill   • RELION INSULIN SYR 0.5ML/31G 31G X 5/16\" 0.5 ML Misc USE ONE SYRINGE TO INJECT INSULIN THREE TIMES DAILY 300 Each 0   • valsartan-hydrochlorothiazide (DIOVAN-HCT) 320-12.5 MG per tablet TAKE ONE TABLET BY MOUTH ONCE DAILY 90 Tab 0   • finasteride (PROSCAR) 5 MG Tab Take 1 Tab by mouth every day. 90 Tab 0   • insulin 70/30 (NOVOLIN 70/30 RELION) (70-30) 100 UNIT/ML Suspension 17 units before breakfast and 17 units before dinner 20 mL 6   • omeprazole (PRILOSEC) 20 MG delayed-release capsule Take 2 Caps by mouth every day. 60 Cap 3   • warfarin (COUMADIN) 6 MG Tab Take 1/2-1 tablet by mouth daily as directed by Anticoagulation Program 90 Tab 3   • pravastatin (PRAVACHOL) 40 MG tablet Take 1 Tab by mouth every bedtime. 90 Tab 3   • Blood Glucose Monitoring Suppl SUPPLIES Misc One Touch ultra blue glucometer strips for blood sugar checked twice a day, 90 days 200 Each 3   • amlodipine (NORVASC) 5 MG TABS Take 5 mg by mouth every day.       No current [facility]-ordered facility-administered medications on file.        Constitutional " "ROS: No unexpected change in weight, No weakness, No unexplained fevers, sweats, or chills  Pulmonary ROS: No chronic cough, sputum, or hemoptysis, No shortness of breath, No recent change in breathing  Cardiovascular ROS: Positive for atrial fibrillation, hypertension, hyperlipidemia  Gastrointestinal ROS: No abdominal pain, No nausea, vomiting, diarrhea, or constipation  Musculoskeletal/Extremities ROS: Positive for left below the knee amputation  Neurologic ROS: Normal development, No seizures, No weakness  Endocrine ROS: Positive per history of present illness    Physical exam:  /46   Pulse (!) 46   Temp 37.1 °C (98.8 °F)   Resp 16   Ht 1.778 m (5' 10\") Comment: taken from last visit, patient in wheelchair today  Wt 90.7 kg (200 lb)   SpO2 98%   BMI 28.70 kg/m²   General Appearance: Elderly male that is extremely hard of hearing, alert, no distress, moderately overweight, well-groomed  Skin: Skin color, texture, turgor normal. No rashes or lesions.  Lungs: negative findings: normal respiratory rate and rhythm, lungs clear to auscultation  Heart: positive findings: bradycardia  Abdomen: Abdomen soft, non-tender. BS normal. No masses,  No organomegaly  Extremities: positive findings: Left below the knee amputation, prosthetic not present, patient in wheelchair  Neurologic: intact    Medical decision making/discussion: Patient to follow-up with me in 4 months, sooner if needed. He is to continue care per his specialists including endocrinology, cardiology and anticoagulation clinic. He is to have labs done before he follows up with me in 4 months. He is to take all of his medications as prescribed and call for refills as needed.    John MOISE was seen today for diabetes.    Diagnoses and all orders for this visit:    Type 2 diabetes mellitus with complication, with long-term current use of insulin (HCC)    Long term (current) use of anticoagulants [Z79.01]    Atrial fibrillation, unspecified type " (HCC)    Traumatic amputation of left foot with complication, sequela (HCC)    Shortness of breath    Dyslipidemia  -     COMP METABOLIC PANEL; Future  -     LIPID PROFILE; Future    Essential hypertension  -     COMP METABOLIC PANEL; Future  -     LIPID PROFILE; Future    Other orders  -     Obtain Results: Other (see comment); Obtain Results From: Other (see comment)          Please note that this dictation was created using voice recognition software. I have made every reasonable attempt to correct obvious errors, but I expect that there are errors of grammar and possibly content that I did not discover before finalizing the note.

## 2018-05-14 NOTE — ASSESSMENT & PLAN NOTE
This is a chronic condition, stable and controlled with current medications. He is followed by cardiology in the anticoagulation clinic. He is on Coumadin, he has upcoming appointment with the anticoagulation clinic at the end of this month.

## 2018-05-14 NOTE — ASSESSMENT & PLAN NOTE
Patient was last seen by me in mid February he was complaining of shortness of breath and chest pain off and on since he had attempted heart catheterization by cardiologist at [facility] in December 2017. We were able to get him in to see his cardiologist later that week in mid February and he was taken some time in March to have heart catheterization with stent placement. We have requested these records. He does not report any more shortness of breath or chest pain.

## 2018-06-11 ENCOUNTER — ANTICOAGULATION MONITORING (OUTPATIENT)
Dept: VASCULAR LAB | Facility: MEDICAL CENTER | Age: 83
End: 2018-06-11

## 2018-06-11 DIAGNOSIS — Z79.01 LONG TERM CURRENT USE OF ANTICOAGULANT THERAPY: ICD-10-CM

## 2018-06-11 NOTE — PROGRESS NOTES
Anticoagulation clinic    Reminder voice message for patient regarding getting INR done ASAP for anticoagulation clinic.     Keith Collado, PharmD

## 2018-06-15 ENCOUNTER — ANTICOAGULATION VISIT (OUTPATIENT)
Dept: MEDICAL GROUP | Facility: PHYSICIAN GROUP | Age: 83
End: 2018-06-15
Payer: MEDICARE

## 2018-06-15 DIAGNOSIS — Z79.01 LONG TERM CURRENT USE OF ANTICOAGULANT THERAPY: ICD-10-CM

## 2018-06-15 LAB — INR PPP: 3.9 (ref 2–3.5)

## 2018-06-15 PROCEDURE — 85610 PROTHROMBIN TIME: CPT | Performed by: NURSE PRACTITIONER

## 2018-06-15 PROCEDURE — 99211 OFF/OP EST MAY X REQ PHY/QHP: CPT | Performed by: NURSE PRACTITIONER

## 2018-06-15 NOTE — PROGRESS NOTES
Anticoagulation Summary  As of 6/15/2018    INR goal:   2.0-3.0   TTR:   --   Today's INR:   3.9!   Warfarin maintenance plan:   6 mg (6 mg x 1) on Mon, Fri; 3 mg (6 mg x 0.5) all other days   Weekly warfarin total:   27 mg   Plan last modified:   Fredy Ashley, PharmD (6/15/2018)   Next INR check:   7/6/2018   Target end date:   Indefinite    Indications    Atrial fibrillation (HCC) (Resolved) [I48.91]  Long term (current) use of anticoagulants [Z79.01] [Z79.01]             Anticoagulation Episode Summary     [facility]   [facility]    Preferred lab:       Send INR reminders to:       Comments:   TTR is 45.05%, consider alternative - JEMIMA       Anticoagulation Care Providers     Provider Role Specialty Phone number    Dontae Ng D.O. Referring Cardiology [phone]    [facility] Anticoagulation Services   [phone]    AIYANA Easley  Family Medicine [phone]        Anticoagulation Patient Findings  Patient Findings     Positives:   Signs/symptoms of bleeding    Negatives:   Signs/symptoms of thrombosis, Laboratory test error suspected, Change in health, Change in alcohol use, Change in activity, Upcoming invasive procedure, Emergency department visit, Upcoming dental procedure, Missed doses, Extra doses, Change in medications, Change in diet/appetite, Hospital admission, Bruising, Other complaints        History of Present Illness: follow up appointment for chronic anticoagulation with the high risk medication, warfarin for atrial fibrillation.    Last INR was out of range, dosage adjusted: no    INR supratherapeutic  - unknown cause, patient not a strong communicator  - says some medications were stopped but unsure of what - he was having epistaxis   - Furosemide/hydralazine? Maybe plavix in april  Lower dose today to 3 mg  10% weekly dose reduction.  F/U INR in 3 weeks  No complaints from patient    Fredy Ashley, PharmD

## 2018-07-06 ENCOUNTER — ANTICOAGULATION VISIT (OUTPATIENT)
Dept: MEDICAL GROUP | Facility: PHYSICIAN GROUP | Age: 83
End: 2018-07-06
Payer: MEDICARE

## 2018-07-06 VITALS
HEART RATE: 50 BPM | BODY MASS INDEX: 28.7 KG/M2 | WEIGHT: 200 LBS | DIASTOLIC BLOOD PRESSURE: 72 MMHG | SYSTOLIC BLOOD PRESSURE: 140 MMHG

## 2018-07-06 DIAGNOSIS — Z79.01 LONG TERM CURRENT USE OF ANTICOAGULANT THERAPY: ICD-10-CM

## 2018-07-06 LAB — INR PPP: 2.5 (ref 2–3.5)

## 2018-07-06 PROCEDURE — 85610 PROTHROMBIN TIME: CPT | Performed by: NURSE PRACTITIONER

## 2018-07-06 PROCEDURE — 99999 PR NO CHARGE: CPT | Performed by: NURSE PRACTITIONER

## 2018-07-06 NOTE — PROGRESS NOTES
OP Anticoagulation Service Note    Date: 7/6/2018  There were no vitals filed for this visit.    Anticoagulation Summary  As of 7/6/2018    INR goal:   2.0-3.0   TTR:   49.8 % (3 y)   Today's INR:   2.5   Warfarin maintenance plan:   6 mg (6 mg x 1) on Mon, Fri; 3 mg (6 mg x 0.5) all other days   Weekly warfarin total:   27 mg   Plan last modified:   Fredy Ashley, PharmD (6/15/2018)   Next INR check:      Target end date:   Indefinite    Indications    Atrial fibrillation (HCC) (Resolved) [I48.91]  Long term (current) use of anticoagulants [Z79.01] [Z79.01]             Anticoagulation Episode Summary     INR check location:   [facility]    Preferred lab:       Send INR reminders to:       Comments:   TTR is 45.05%, consider alternative - JEMIMA       Anticoagulation Care Providers     Provider Role Specialty Phone number    Dontae Ng D.O. [facility] Cardiology [phone]    [facility] Anticoagulation Services   [phone]    AIYANA Easley  Family Medicine [phone]        Anticoagulation Patient Findings      HPI:   John Hernandez seen in clinic today, they are here today for a INR check on anticoagulation therapy with warfarin because they have atrial fib    The reason for today's visit is to prevent morbidity and mortality from a stroke  and to reduce the risk of bleeding while on a anticoagulant.     Additional education provided today regarding reducing bleed risk and dietary constraints:  About how vitamin K and foods work with warfarin and the bleeding risk on a anticoagulant     Any upcoming procedures:   none    Confirmed warfarin dosing regimen  Interval Changes with foods rich in vitamin K: No  Interval Changes in ETOH:   No  Interval Changes in smoking status:  No  Interval Changes in medication:  No   Cost restriction:  No    S/S of bleeding or bruising:  No  Signs/symptoms  thrombosis since the last appt:  No  Bleed risk is:  moderate,     3 vitals included with  today's appt :  (BP, HR, weight, ht, RR)     Assessment:   INR  -therapeutic.        no change is needed today because INR is in range.      They have a TTR of 50.1  which is not at target (TTR target/goal is 100%) and requires close follow up to prevent a adverse event (the lower the TTR the higher risk of clots, strokes, or bleeding).       Plan:  Continue weekly warfarin dose as noted      Follow up:  Follow up appointment in 5 week(s)       Other info:  Pt educated to contact our clinic with any changes in medications or s/s of bleeding or thrombosis    CHEST guidelines recommend frequent INR monitoring at regular intervals (a few days up to a max of 12 weeks) to ensure they are on the proper dose of warfarin and not having any complications from therapy.  INRs can dramatically change over a short time period due to diet, medications, and medical conditions.

## 2018-07-19 ENCOUNTER — HOSPITAL ENCOUNTER (OUTPATIENT)
Dept: LAB | Facility: MEDICAL CENTER | Age: 83
End: 2018-07-19
Attending: INTERNAL MEDICINE
Payer: MEDICARE

## 2018-07-19 LAB
ANION GAP SERPL CALC-SCNC: 10 MMOL/L (ref 0–11.9)
BNP SERPL-MCNC: 384 PG/ML (ref 0–100)
BUN SERPL-MCNC: 41 MG/DL (ref 8–22)
CALCIUM SERPL-MCNC: 9.3 MG/DL (ref 8.5–10.5)
CHLORIDE SERPL-SCNC: 101 MMOL/L (ref 96–112)
CO2 SERPL-SCNC: 25 MMOL/L (ref 20–33)
CREAT SERPL-MCNC: 1.58 MG/DL (ref 0.5–1.4)
GLUCOSE SERPL-MCNC: 92 MG/DL (ref 65–99)
POTASSIUM SERPL-SCNC: 4.8 MMOL/L (ref 3.6–5.5)
SODIUM SERPL-SCNC: 136 MMOL/L (ref 135–145)

## 2018-07-19 PROCEDURE — 36415 COLL VENOUS BLD VENIPUNCTURE: CPT

## 2018-07-19 PROCEDURE — 80048 BASIC METABOLIC PNL TOTAL CA: CPT

## 2018-07-19 PROCEDURE — 83880 ASSAY OF NATRIURETIC PEPTIDE: CPT

## 2018-07-24 RX ORDER — VALSARTAN AND HYDROCHLOROTHIAZIDE 320; 12.5 MG/1; MG/1
TABLET, FILM COATED ORAL
Qty: 90 TAB | Refills: 1 | Status: SHIPPED | OUTPATIENT
Start: 2018-07-24 | End: 2018-07-25 | Stop reason: RX

## 2018-07-24 NOTE — TELEPHONE ENCOUNTER
Refill X 6 months, sent to pharmacy.Pt. Seen in the last 6 months per protocol.   Lab Results   Component Value Date/Time    SODIUM 136 07/19/2018 10:15 AM    POTASSIUM 4.8 07/19/2018 10:15 AM    CHLORIDE 101 07/19/2018 10:15 AM    CO2 25 07/19/2018 10:15 AM    GLUCOSE 92 07/19/2018 10:15 AM    BUN 41 (H) 07/19/2018 10:15 AM    CREATININE 1.58 (H) 07/19/2018 10:15 AM    CREATININE 1.0 04/05/2006 08:10 AM

## 2018-07-24 NOTE — TELEPHONE ENCOUNTER
Was the patient seen in the last year in this department? Yes     Does patient have an active prescription for medications requested? No     Received Request Via: Pharmacy      Pt met protocol?: Yes, OV 5/18   BP Readings from Last 1 Encounters:   07/06/18 140/72

## 2018-07-25 DIAGNOSIS — I10 ESSENTIAL HYPERTENSION: ICD-10-CM

## 2018-07-25 RX ORDER — VALSARTAN AND HYDROCHLOROTHIAZIDE 320; 12.5 MG/1; MG/1
1 TABLET, FILM COATED ORAL
Qty: 90 TAB | Refills: 1 | OUTPATIENT
Start: 2018-07-25

## 2018-07-25 RX ORDER — OLMESARTAN MEDOXOMIL AND HYDROCHLOROTHIAZIDE 40/12.5 40; 12.5 MG/1; MG/1
1 TABLET ORAL DAILY
Qty: 90 TAB | Refills: 1 | Status: SHIPPED | OUTPATIENT
Start: 2018-07-25

## 2018-07-25 NOTE — TELEPHONE ENCOUNTER
Ria- All forms of valsartan are unavailable. Other options for this pt at this dose include: Benicar HCT 40/12.5mg, Atacand HCT 32/12.5mg and Micardis HCT 80/12.5. Please change as you see fit.

## 2018-08-10 ENCOUNTER — ANTICOAGULATION VISIT (OUTPATIENT)
Dept: MEDICAL GROUP | Facility: PHYSICIAN GROUP | Age: 83
End: 2018-08-10
Payer: MEDICARE

## 2018-08-10 VITALS
SYSTOLIC BLOOD PRESSURE: 114 MMHG | DIASTOLIC BLOOD PRESSURE: 46 MMHG | BODY MASS INDEX: 28.7 KG/M2 | WEIGHT: 200 LBS | HEART RATE: 63 BPM

## 2018-08-10 DIAGNOSIS — Z79.01 LONG TERM (CURRENT) USE OF ANTICOAGULANTS: ICD-10-CM

## 2018-08-10 LAB — INR PPP: 3.7 (ref 2–3.5)

## 2018-08-10 PROCEDURE — 99211 OFF/OP EST MAY X REQ PHY/QHP: CPT | Performed by: FAMILY MEDICINE

## 2018-08-10 PROCEDURE — 85610 PROTHROMBIN TIME: CPT | Performed by: FAMILY MEDICINE

## 2018-08-10 NOTE — PROGRESS NOTES
OP Anticoagulation Service Note    Date: 8/10/2018  There were no vitals filed for this visit.    Anticoagulation Summary  As of 8/10/2018    INR goal:   2.0-3.0   TTR:   49.6 % (3.1 y)   Today's INR:   3.7!   Warfarin maintenance plan:   6 mg (6 mg x 1) on Mon, Fri; 3 mg (6 mg x 0.5) all other days   Weekly warfarin total:   27 mg   Plan last modified:   Fredy Ashley, PharmD (6/15/2018)   Next INR check:   9/7/2018   Target end date:   Indefinite    Indications    Atrial fibrillation (HCC) (Resolved) [I48.91]  Long term (current) use of anticoagulants [Z79.01] [Z79.01]             Anticoagulation Episode Summary     [facility]R check location:   [facility]    Preferred lab:       Send INR reminders to:       Comments:   TTR is 45.05%, consider alternative -      Anticoagulation Care Providers     Provider Role Specialty Phone number    Dontae Ng D.O. [facility] Cardiology [phone]    [facility]ticoagulation Services   [phone]    AIYANA Easley  Family Medicine [phone]        Anticoagulation Patient Findings      HPI:   John Hernandez seen in clinic today, they are here today for a INR check on anticoagulation therapy with warfarin because they have atrial fib    The reason for today's visit is to prevent morbidity and mortality from a stroke  and to reduce the risk of bleeding while on a anticoagulant.     Additional education provided today regarding reducing bleed risk and dietary constraints:  About how vitamin K and foods work with warfarin and the bleeding risk on a anticoagulant     Any upcoming procedures:   none    Confirmed warfarin dosing regimen  Interval Changes with foods rich in vitamin K: No  Interval Changes in ETOH:   No  Interval Changes in smoking status:  No  Interval Changes in medication:  No   Cost restriction:  No    S/S of bleeding or bruising:  No  Signs/symptoms  thrombosis since the last appt:  No  Bleed risk is:  moderate,     3 vitals included with  today's appt :  (BP, HR, weight, ht, RR)     Assessment:   INR  supra-therapeutic.        a change is needed today because INR is out of  range.      They have a TTR of 49.6  which is not at target (TTR target/goal is 100%) and requires close follow up to prevent a adverse event (the lower the TTR the higher risk of clots, strokes, or bleeding).       Plan:  Hold today then continue weekly warfarin dose as noted      Follow up:  Follow up appointment in 4 week(s) per pt      Other info:  Pt educated to contact our clinic with any changes in medications or s/s of bleeding or thrombosis    CHEST guidelines recommend frequent INR monitoring at regular intervals (a few days up to a max of 12 weeks) to ensure they are on the proper dose of warfarin and not having any complications from therapy.  INRs can dramatically change over a short time period due to diet, medications, and medical conditions.

## 2025-01-13 NOTE — PROGRESS NOTES
"Anticoagulation Summary as of 8/25/2017     INR goal 2.0-3.0   Selected INR 1.7! (8/25/2017)   Maintenance plan 3 mg (6 mg x 0.5) on Sun, Tue, Thu; 6 mg (6 mg x 1) all other days   Weekly total 33 mg   Plan last modified Keith Collado, PHARMD (8/25/2017)   Next INR check    Target end date Indefinite    Indications   Atrial fibrillation (CMS-HCC) [I48.91]  Long term (current) use of anticoagulants [Z79.01] [Z79.01]         Anticoagulation Episode Summary     [facility]R check location [facility]    Preferred lab     Send INR reminders to     Comments TTR is 45.05%, consider alternative      Anticoagulation Care Providers     Provider Role Specialty Phone number    Dontae Ng D.O. Referring Cardiology [phone]    [facility] Anticoagulation Services   [phone]    AIYANA Easley  Family Medicine [phone]        Anticoagulation Patient Findings      Current Outpatient Prescriptions on File Prior to Visit   Medication Sig Dispense Refill   • Blood Glucose Monitoring Suppl SUPPLIES Misc One Touch ultra blue glucometer strips for blood sugar checked twice a day, 90 days 200 Each 3   • valsartan-hydrochlorothiazide (DIOVAN HCT) 320-12.5 MG per tablet Take 1 Tab by mouth every day. 90 Tab 0   • pioglitazone (ACTOS) 15 MG Tab Take 1 Tab by mouth every day. 30 Tab 4   • pravastatin (PRAVACHOL) 20 MG Tab Take 2 Tabs by mouth every day. 180 Tab 2   • omeprazole (PRILOSEC) 20 MG delayed-release capsule Take 2 Caps by mouth every day. 60 Cap 3   • warfarin (COUMADIN) 6 MG Tab Take 1 Tab by mouth every day at 6 PM. 90 Tab 1   • insulin 70/30 (NOVOLIN 70/30 RELION) (70-30) 100 UNIT/ML Suspension Inject 18 Units as instructed 3 times a day before meals. 20 mL 6   • Insulin Syringe-Needle U-100 (INSULIN SYRINGE .5CC/31GX5/16\") 31G X 5/16\" 0.5 ML Misc For insulin injections 3 times a day 300 Each 2   • furosemide (LASIX) 40 MG Tab Take 40 mg by mouth every day.     • hydrALAZINE (APRESOLINE) 25 MG TABS Take 25 " mg by mouth 3 times a day.     • potassium chloride CR (K-TABS) 10 MEQ tablet Take 20 mEq by mouth every day.     • amlodipine (NORVASC) 5 MG TABS Take 5 mg by mouth every day.     • finasteride (PROSCAR) 5 MG TABS Take 5 mg by mouth every day.     • vitamin D (CHOLECALCIFEROL) 1000 UNIT TABS Take 1,000 Units by mouth every day.       No current facility-administered medications on file prior to visit.       Lab Results   Component Value Date/Time    SODIUM 142 02/11/2017 07:45 AM    POTASSIUM 4.2 02/11/2017 07:45 AM    CHLORIDE 107 02/11/2017 07:45 AM    CO2 26 02/11/2017 07:45 AM    GLUCOSE 105* 02/11/2017 07:45 AM    BUN 29* 02/11/2017 07:45 AM    CREATININE 1.15 02/11/2017 07:45 AM          John Hernandez seen in clinic today  INR  sub-therapeutic.    Denies signs/symptoms of bleeding and/or thrombosis.    Denies changes to diet or medications.   Follow up appointment in 3 week(s).      Increase weekly warfarin dose as noted       Keith Collado, PHARMD            13-Jan-2025